# Patient Record
Sex: MALE | Race: WHITE | NOT HISPANIC OR LATINO
[De-identification: names, ages, dates, MRNs, and addresses within clinical notes are randomized per-mention and may not be internally consistent; named-entity substitution may affect disease eponyms.]

---

## 2017-04-10 ENCOUNTER — RX RENEWAL (OUTPATIENT)
Age: 67
End: 2017-04-10

## 2017-06-14 ENCOUNTER — LABORATORY RESULT (OUTPATIENT)
Age: 67
End: 2017-06-14

## 2017-06-14 ENCOUNTER — NON-APPOINTMENT (OUTPATIENT)
Age: 67
End: 2017-06-14

## 2017-06-14 ENCOUNTER — APPOINTMENT (OUTPATIENT)
Dept: CARDIOLOGY | Facility: CLINIC | Age: 67
End: 2017-06-14

## 2017-06-14 VITALS
HEIGHT: 78 IN | SYSTOLIC BLOOD PRESSURE: 139 MMHG | BODY MASS INDEX: 29.39 KG/M2 | OXYGEN SATURATION: 97 % | HEART RATE: 80 BPM | WEIGHT: 254 LBS | DIASTOLIC BLOOD PRESSURE: 92 MMHG

## 2017-06-16 LAB
25(OH)D3 SERPL-MCNC: 34.6 NG/ML
ALBUMIN SERPL ELPH-MCNC: 4.5 G/DL
ALP BLD-CCNC: 71 U/L
ALT SERPL-CCNC: 16 U/L
ANION GAP SERPL CALC-SCNC: 13 MMOL/L
AST SERPL-CCNC: 21 U/L
BASOPHILS # BLD AUTO: 0.06 K/UL
BASOPHILS NFR BLD AUTO: 0.6 %
BILIRUB SERPL-MCNC: 0.8 MG/DL
BUN SERPL-MCNC: 8 MG/DL
CALCIUM SERPL-MCNC: 9.5 MG/DL
CHLORIDE SERPL-SCNC: 102 MMOL/L
CHOLEST SERPL-MCNC: 192 MG/DL
CHOLEST/HDLC SERPL: 2.7 RATIO
CK SERPL-CCNC: 160 U/L
CO2 SERPL-SCNC: 25 MMOL/L
CREAT SERPL-MCNC: 0.96 MG/DL
EOSINOPHIL # BLD AUTO: 0.3 K/UL
EOSINOPHIL NFR BLD AUTO: 3.1 %
GLUCOSE SERPL-MCNC: 102 MG/DL
HBA1C MFR BLD HPLC: 5.7 %
HCT VFR BLD CALC: 48.8 %
HDLC SERPL-MCNC: 72 MG/DL
HGB BLD-MCNC: 16.3 G/DL
IMM GRANULOCYTES NFR BLD AUTO: 0.3 %
LDLC SERPL CALC-MCNC: 93 MG/DL
LDLC SERPL DIRECT ASSAY-MCNC: 103 MG/DL
LYMPHOCYTES # BLD AUTO: 2.52 K/UL
LYMPHOCYTES NFR BLD AUTO: 26.2 %
MAN DIFF?: NORMAL
MCHC RBC-ENTMCNC: 30.9 PG
MCHC RBC-ENTMCNC: 33.4 GM/DL
MCV RBC AUTO: 92.4 FL
MONOCYTES # BLD AUTO: 0.75 K/UL
MONOCYTES NFR BLD AUTO: 7.8 %
NEUTROPHILS # BLD AUTO: 5.95 K/UL
NEUTROPHILS NFR BLD AUTO: 62 %
PLATELET # BLD AUTO: 227 K/UL
POTASSIUM SERPL-SCNC: 5.2 MMOL/L
PROT SERPL-MCNC: 7.2 G/DL
RBC # BLD: 5.28 M/UL
RBC # FLD: 13.5 %
SODIUM SERPL-SCNC: 140 MMOL/L
T3 SERPL-MCNC: 102 NG/DL
T3FREE SERPL-MCNC: 2.93 PG/ML
T3RU NFR SERPL: 0.93 INDEX
T4 FREE SERPL-MCNC: 1.1 NG/DL
T4 SERPL-MCNC: 6.8 UG/DL
TRIGL SERPL-MCNC: 133 MG/DL
TSH SERPL-ACNC: 0.54 UIU/ML
WBC # FLD AUTO: 9.61 K/UL

## 2017-10-03 ENCOUNTER — RX RENEWAL (OUTPATIENT)
Age: 67
End: 2017-10-03

## 2017-11-28 ENCOUNTER — APPOINTMENT (OUTPATIENT)
Dept: CARDIOLOGY | Facility: CLINIC | Age: 67
End: 2017-11-28
Payer: MEDICARE

## 2017-11-28 PROCEDURE — 93306 TTE W/DOPPLER COMPLETE: CPT

## 2017-12-13 ENCOUNTER — NON-APPOINTMENT (OUTPATIENT)
Age: 67
End: 2017-12-13

## 2017-12-13 ENCOUNTER — APPOINTMENT (OUTPATIENT)
Dept: CARDIOLOGY | Facility: CLINIC | Age: 67
End: 2017-12-13
Payer: MEDICARE

## 2017-12-13 VITALS
SYSTOLIC BLOOD PRESSURE: 139 MMHG | DIASTOLIC BLOOD PRESSURE: 97 MMHG | HEIGHT: 72 IN | WEIGHT: 259 LBS | OXYGEN SATURATION: 95 % | BODY MASS INDEX: 35.08 KG/M2 | HEART RATE: 80 BPM

## 2017-12-13 DIAGNOSIS — I50.1 LEFT VENTRICULAR FAILURE, UNSPECIFIED: ICD-10-CM

## 2017-12-13 DIAGNOSIS — R06.09 OTHER FORMS OF DYSPNEA: ICD-10-CM

## 2017-12-13 PROCEDURE — 99215 OFFICE O/P EST HI 40 MIN: CPT | Mod: 25

## 2017-12-13 PROCEDURE — 93000 ELECTROCARDIOGRAM COMPLETE: CPT

## 2017-12-18 ENCOUNTER — RX RENEWAL (OUTPATIENT)
Age: 67
End: 2017-12-18

## 2017-12-19 ENCOUNTER — APPOINTMENT (OUTPATIENT)
Dept: CARDIOTHORACIC SURGERY | Facility: CLINIC | Age: 67
End: 2017-12-19
Payer: MEDICARE

## 2017-12-19 VITALS
OXYGEN SATURATION: 97 % | BODY MASS INDEX: 34.4 KG/M2 | SYSTOLIC BLOOD PRESSURE: 142 MMHG | WEIGHT: 254 LBS | HEART RATE: 83 BPM | HEIGHT: 72 IN | RESPIRATION RATE: 15 BRPM | DIASTOLIC BLOOD PRESSURE: 98 MMHG | TEMPERATURE: 97.6 F

## 2017-12-19 VITALS — DIASTOLIC BLOOD PRESSURE: 98 MMHG | SYSTOLIC BLOOD PRESSURE: 150 MMHG

## 2017-12-19 DIAGNOSIS — Z78.9 OTHER SPECIFIED HEALTH STATUS: ICD-10-CM

## 2017-12-19 PROCEDURE — 99205 OFFICE O/P NEW HI 60 MIN: CPT

## 2017-12-29 ENCOUNTER — OUTPATIENT (OUTPATIENT)
Dept: OUTPATIENT SERVICES | Facility: HOSPITAL | Age: 67
LOS: 1 days | Discharge: HOME | End: 2017-12-29

## 2017-12-29 VITALS
OXYGEN SATURATION: 98 % | HEART RATE: 88 BPM | RESPIRATION RATE: 16 BRPM | DIASTOLIC BLOOD PRESSURE: 90 MMHG | SYSTOLIC BLOOD PRESSURE: 150 MMHG

## 2017-12-29 DIAGNOSIS — I77.810 THORACIC AORTIC ECTASIA: ICD-10-CM

## 2017-12-29 DIAGNOSIS — S82.90XA UNSPECIFIED FRACTURE OF UNSPECIFIED LOWER LEG, INITIAL ENCOUNTER FOR CLOSED FRACTURE: Chronic | ICD-10-CM

## 2017-12-29 LAB
ANION GAP SERPL CALC-SCNC: 6 MMOL/L — SIGNIFICANT CHANGE UP (ref 5–17)
BUN SERPL-MCNC: 12 MG/DL — SIGNIFICANT CHANGE UP (ref 7–23)
CALCIUM SERPL-MCNC: 9.3 MG/DL — SIGNIFICANT CHANGE UP (ref 8.5–10.1)
CHLORIDE SERPL-SCNC: 106 MMOL/L — SIGNIFICANT CHANGE UP (ref 96–108)
CO2 SERPL-SCNC: 27 MMOL/L — SIGNIFICANT CHANGE UP (ref 22–31)
CREAT SERPL-MCNC: 0.96 MG/DL — SIGNIFICANT CHANGE UP (ref 0.5–1.3)
GLUCOSE SERPL-MCNC: 86 MG/DL — SIGNIFICANT CHANGE UP (ref 70–99)
HCT VFR BLD CALC: 54.6 % — HIGH (ref 39–50)
HGB BLD-MCNC: 17.8 G/DL — HIGH (ref 13–17)
MCHC RBC-ENTMCNC: 31.2 PG — SIGNIFICANT CHANGE UP (ref 27–34)
MCHC RBC-ENTMCNC: 32.7 GM/DL — SIGNIFICANT CHANGE UP (ref 32–36)
MCV RBC AUTO: 95.5 FL — SIGNIFICANT CHANGE UP (ref 80–100)
PLATELET # BLD AUTO: 201 K/UL — SIGNIFICANT CHANGE UP (ref 150–400)
POTASSIUM SERPL-MCNC: 4.2 MMOL/L — SIGNIFICANT CHANGE UP (ref 3.5–5.3)
POTASSIUM SERPL-SCNC: 4.2 MMOL/L — SIGNIFICANT CHANGE UP (ref 3.5–5.3)
RBC # BLD: 5.72 M/UL — SIGNIFICANT CHANGE UP (ref 4.2–5.8)
RBC # FLD: 11.9 % — SIGNIFICANT CHANGE UP (ref 10.3–14.5)
SODIUM SERPL-SCNC: 139 MMOL/L — SIGNIFICANT CHANGE UP (ref 135–145)
WBC # BLD: 9.3 K/UL — SIGNIFICANT CHANGE UP (ref 3.8–10.5)
WBC # FLD AUTO: 9.3 K/UL — SIGNIFICANT CHANGE UP (ref 3.8–10.5)

## 2017-12-29 NOTE — H&P CARDIOLOGY - HISTORY OF PRESENT ILLNESS
67 year old male with PMHx of 67 year old male with PMHx of HTN, BARRON on Bi-PAP, Afib on xarelto, echo showed dilated ascending aorta 5 cm with EF 56%. Pt referred for LHC followed by CT surgery consult with  at [facility].

## 2017-12-29 NOTE — H&P CARDIOLOGY - PMH
Abnormal stress test    Ascending aorta dilation    Atrial fibrillation    Hypertension    LVH (left ventricular hypertrophy)    Mitral valve insufficiency    BARRON (obstructive sleep apnea)

## 2018-01-02 ENCOUNTER — OUTPATIENT (OUTPATIENT)
Dept: OUTPATIENT SERVICES | Facility: HOSPITAL | Age: 68
LOS: 1 days | Discharge: HOME | End: 2018-01-02
Payer: MEDICARE

## 2018-01-02 VITALS
OXYGEN SATURATION: 97 % | DIASTOLIC BLOOD PRESSURE: 98 MMHG | SYSTOLIC BLOOD PRESSURE: 123 MMHG | RESPIRATION RATE: 16 BRPM | HEART RATE: 77 BPM

## 2018-01-02 VITALS
WEIGHT: 250 LBS | TEMPERATURE: 97 F | RESPIRATION RATE: 18 BRPM | HEIGHT: 75 IN | DIASTOLIC BLOOD PRESSURE: 89 MMHG | SYSTOLIC BLOOD PRESSURE: 144 MMHG | HEART RATE: 71 BPM

## 2018-01-02 DIAGNOSIS — S82.90XA UNSPECIFIED FRACTURE OF UNSPECIFIED LOWER LEG, INITIAL ENCOUNTER FOR CLOSED FRACTURE: Chronic | ICD-10-CM

## 2018-01-02 PROCEDURE — 99152 MOD SED SAME PHYS/QHP 5/>YRS: CPT

## 2018-01-02 PROCEDURE — 93458 L HRT ARTERY/VENTRICLE ANGIO: CPT | Mod: 26

## 2018-01-02 PROCEDURE — 93567 NJX CAR CTH SPRVLV AORTGRPHY: CPT

## 2018-01-02 RX ORDER — ALBUTEROL 90 UG/1
1 AEROSOL, METERED ORAL
Qty: 0 | Refills: 0 | COMMUNITY

## 2018-01-02 NOTE — PROGRESS NOTE ADULT - SUBJECTIVE AND OBJECTIVE BOX
s/p C    Patient feels well.  Denies chest pain, shortness of breath, dizziness or palpitations at this time    Right radial hemoband in place.  Procedure site CDI, no bleeding, no hematoma, able to move all digits with capillary refill < 3 seconds, fingers warm      HPI:    now s/p C revealing non obstructive CAD    --vital signs, diet and activity per post procedure orders  -ambulte ad oscar post bedrest  -encourage PO fluids  -plan of care discussed with patient, family and MD  -d/c after bedrest if stable  -post procedure and d/c instructions reviewed  -follow up with MD in 1-2 weeks  -Discussed therapeutic lifestyle changes to reduce risk factors such as following a cardiac diet, weight loss, maintaining a healthy weight, exercise, smoking cessation, medication compliance, and regular follow-up  with MD s/p LHC    Patient feels well.  Denies chest pain, shortness of breath, dizziness or palpitations at this time    Right radial hemoband removed.  Procedure site CDI, no bleeding, no hematoma, able to move all digits with capillary refill < 3 seconds, fingers warm      HPI:67 year old male with PMHx of HTN, BARRON on Bi-PAP, Afib on xarelto, echo showed dilated ascending aorta 5 cm with EF 56%. Pt referred for LHC followed by CT surgery consult with  at [facility].    now s/p LHC revealing non obstructive CAD    --vital signs, diet and activity per post procedure orders  -ambulate ad oscar post bedrest  -encourage PO fluids  -plan of care discussed with patient, family and MD  -d/c after bedrest if stable  -post procedure and d/c instructions reviewed  -follow up with MD in 1-2 weeks  -Discussed therapeutic lifestyle changes to reduce risk factors such as following a cardiac diet, weight loss, maintaining a healthy weight, exercise, smoking cessation, medication compliance, and regular follow-up  with MD

## 2018-01-10 ENCOUNTER — APPOINTMENT (OUTPATIENT)
Dept: CARDIOLOGY | Facility: CLINIC | Age: 68
End: 2018-01-10
Payer: MEDICARE

## 2018-01-10 VITALS — SYSTOLIC BLOOD PRESSURE: 119 MMHG | DIASTOLIC BLOOD PRESSURE: 84 MMHG | OXYGEN SATURATION: 97 % | HEART RATE: 51 BPM

## 2018-01-10 VITALS — BODY MASS INDEX: 34.86 KG/M2 | WEIGHT: 257 LBS

## 2018-01-10 PROCEDURE — 99214 OFFICE O/P EST MOD 30 MIN: CPT | Mod: 25

## 2018-01-10 PROCEDURE — 93000 ELECTROCARDIOGRAM COMPLETE: CPT

## 2018-01-11 ENCOUNTER — NON-APPOINTMENT (OUTPATIENT)
Age: 68
End: 2018-01-11

## 2018-01-12 DIAGNOSIS — G47.33 OBSTRUCTIVE SLEEP APNEA (ADULT) (PEDIATRIC): ICD-10-CM

## 2018-01-12 DIAGNOSIS — I48.91 UNSPECIFIED ATRIAL FIBRILLATION: ICD-10-CM

## 2018-01-12 DIAGNOSIS — R93.1 ABNORMAL FINDINGS ON DIAGNOSTIC IMAGING OF HEART AND CORONARY CIRCULATION: ICD-10-CM

## 2018-01-12 DIAGNOSIS — I10 ESSENTIAL (PRIMARY) HYPERTENSION: ICD-10-CM

## 2018-01-12 DIAGNOSIS — I34.0 NONRHEUMATIC MITRAL (VALVE) INSUFFICIENCY: ICD-10-CM

## 2018-01-12 DIAGNOSIS — I71.2 THORACIC AORTIC ANEURYSM, WITHOUT RUPTURE: ICD-10-CM

## 2018-01-12 DIAGNOSIS — Z87.891 PERSONAL HISTORY OF NICOTINE DEPENDENCE: ICD-10-CM

## 2018-01-25 ENCOUNTER — OUTPATIENT (OUTPATIENT)
Dept: OUTPATIENT SERVICES | Facility: HOSPITAL | Age: 68
LOS: 1 days | End: 2018-01-25
Payer: MEDICARE

## 2018-01-25 VITALS
TEMPERATURE: 98 F | OXYGEN SATURATION: 96 % | DIASTOLIC BLOOD PRESSURE: 82 MMHG | RESPIRATION RATE: 16 BRPM | HEART RATE: 87 BPM | SYSTOLIC BLOOD PRESSURE: 136 MMHG | WEIGHT: 255.96 LBS | HEIGHT: 72 IN

## 2018-01-25 DIAGNOSIS — I77.810 THORACIC AORTIC ECTASIA: ICD-10-CM

## 2018-01-25 DIAGNOSIS — I10 ESSENTIAL (PRIMARY) HYPERTENSION: ICD-10-CM

## 2018-01-25 DIAGNOSIS — I48.91 UNSPECIFIED ATRIAL FIBRILLATION: ICD-10-CM

## 2018-01-25 DIAGNOSIS — Z01.818 ENCOUNTER FOR OTHER PREPROCEDURAL EXAMINATION: ICD-10-CM

## 2018-01-25 DIAGNOSIS — S82.90XA UNSPECIFIED FRACTURE OF UNSPECIFIED LOWER LEG, INITIAL ENCOUNTER FOR CLOSED FRACTURE: Chronic | ICD-10-CM

## 2018-01-25 DIAGNOSIS — G47.33 OBSTRUCTIVE SLEEP APNEA (ADULT) (PEDIATRIC): ICD-10-CM

## 2018-01-25 LAB
ANION GAP SERPL CALC-SCNC: 17 MMOL/L — SIGNIFICANT CHANGE UP (ref 5–17)
BLD GP AB SCN SERPL QL: NEGATIVE — SIGNIFICANT CHANGE UP
BUN SERPL-MCNC: 13 MG/DL — SIGNIFICANT CHANGE UP (ref 7–23)
CALCIUM SERPL-MCNC: 9.9 MG/DL — SIGNIFICANT CHANGE UP (ref 8.4–10.5)
CHLORIDE SERPL-SCNC: 101 MMOL/L — SIGNIFICANT CHANGE UP (ref 96–108)
CO2 SERPL-SCNC: 24 MMOL/L — SIGNIFICANT CHANGE UP (ref 22–31)
CREAT SERPL-MCNC: 1.03 MG/DL — SIGNIFICANT CHANGE UP (ref 0.5–1.3)
GLUCOSE SERPL-MCNC: 104 MG/DL — HIGH (ref 70–99)
HBA1C BLD-MCNC: 5.5 % — SIGNIFICANT CHANGE UP (ref 4–5.6)
HCT VFR BLD CALC: 51.9 % — HIGH (ref 39–50)
HGB BLD-MCNC: 18 G/DL — HIGH (ref 13–17)
MCHC RBC-ENTMCNC: 32.7 PG — SIGNIFICANT CHANGE UP (ref 27–34)
MCHC RBC-ENTMCNC: 34.7 GM/DL — SIGNIFICANT CHANGE UP (ref 32–36)
MCV RBC AUTO: 94.2 FL — SIGNIFICANT CHANGE UP (ref 80–100)
MRSA PCR RESULT.: SIGNIFICANT CHANGE UP
PLATELET # BLD AUTO: 191 K/UL — SIGNIFICANT CHANGE UP (ref 150–400)
POTASSIUM SERPL-MCNC: 4.8 MMOL/L — SIGNIFICANT CHANGE UP (ref 3.5–5.3)
POTASSIUM SERPL-SCNC: 4.8 MMOL/L — SIGNIFICANT CHANGE UP (ref 3.5–5.3)
RBC # BLD: 5.51 M/UL — SIGNIFICANT CHANGE UP (ref 4.2–5.8)
RBC # FLD: 12.9 % — SIGNIFICANT CHANGE UP (ref 10.3–14.5)
RH IG SCN BLD-IMP: POSITIVE — SIGNIFICANT CHANGE UP
S AUREUS DNA NOSE QL NAA+PROBE: DETECTED
SODIUM SERPL-SCNC: 142 MMOL/L — SIGNIFICANT CHANGE UP (ref 135–145)
WBC # BLD: 10.12 K/UL — SIGNIFICANT CHANGE UP (ref 3.8–10.5)
WBC # FLD AUTO: 10.12 K/UL — SIGNIFICANT CHANGE UP (ref 3.8–10.5)

## 2018-01-25 PROCEDURE — 83036 HEMOGLOBIN GLYCOSYLATED A1C: CPT

## 2018-01-25 PROCEDURE — 71046 X-RAY EXAM CHEST 2 VIEWS: CPT | Mod: 26

## 2018-01-25 PROCEDURE — 93880 EXTRACRANIAL BILAT STUDY: CPT

## 2018-01-25 PROCEDURE — G0463: CPT

## 2018-01-25 PROCEDURE — 93880 EXTRACRANIAL BILAT STUDY: CPT | Mod: 26

## 2018-01-25 PROCEDURE — 80048 BASIC METABOLIC PNL TOTAL CA: CPT

## 2018-01-25 PROCEDURE — 86901 BLOOD TYPING SEROLOGIC RH(D): CPT

## 2018-01-25 PROCEDURE — 87640 STAPH A DNA AMP PROBE: CPT

## 2018-01-25 PROCEDURE — 85027 COMPLETE CBC AUTOMATED: CPT

## 2018-01-25 PROCEDURE — 86900 BLOOD TYPING SEROLOGIC ABO: CPT

## 2018-01-25 PROCEDURE — 86850 RBC ANTIBODY SCREEN: CPT

## 2018-01-25 PROCEDURE — 71046 X-RAY EXAM CHEST 2 VIEWS: CPT

## 2018-01-25 PROCEDURE — 87641 MR-STAPH DNA AMP PROBE: CPT

## 2018-01-25 RX ORDER — LIDOCAINE HCL 20 MG/ML
0.2 VIAL (ML) INJECTION ONCE
Qty: 0 | Refills: 0 | Status: DISCONTINUED | OUTPATIENT
Start: 2018-02-08 | End: 2018-02-08

## 2018-01-25 RX ORDER — CEFUROXIME AXETIL 250 MG
1500 TABLET ORAL ONCE
Qty: 0 | Refills: 0 | Status: COMPLETED | OUTPATIENT
Start: 2018-02-08 | End: 2018-02-08

## 2018-01-25 RX ORDER — SODIUM CHLORIDE 9 MG/ML
3 INJECTION INTRAMUSCULAR; INTRAVENOUS; SUBCUTANEOUS EVERY 8 HOURS
Qty: 0 | Refills: 0 | Status: DISCONTINUED | OUTPATIENT
Start: 2018-02-08 | End: 2018-02-08

## 2018-01-25 NOTE — H&P PST ADULT - NEGATIVE CARDIOVASCULAR SYMPTOMS
no chest pain/no claudication/no peripheral edema/no palpitations/no paroxysmal nocturnal dyspnea/no orthopnea/no dyspnea on exertion

## 2018-01-25 NOTE — H&P PST ADULT - MUSCULOSKELETAL
details… detailed exam no joint warmth/no joint swelling/no joint erythema/normal strength/ROM intact

## 2018-01-25 NOTE — H&P PST ADULT - RS GEN PE MLT RESP DETAILS PC
clear to auscultation bilaterally/breath sounds equal/no rales/no rhonchi/no wheezes/no chest wall tenderness

## 2018-01-25 NOTE — H&P PST ADULT - HISTORY OF PRESENT ILLNESS
67 year old male retired [employer] of a [employer] with p 67 year old male retired [employer] with PMH of HTN, LVH, mild chronic LV systolic dysfunction, cardiomyopathy, Afib ( on Xarelto), BARRON(on C-pap), COPD(emphysema), tricuspid regurgitation, lumbar disc problem reports having" found thoracic aortic dilation by a routine echocardiogram, with out much symptoms, on 11/28/17(4.5 to 5.0 cms of ascending aorta"> s/p cardiac cath on 01/02/18( nonobstructive CAD, EF 45%,, coronary ecstasies scheduled for ascending aorta hemiarch replacement via thoracotomy on 02/08/18.

## 2018-01-25 NOTE — H&P PST ADULT - PROBLEM SELECTOR PLAN 2
called surgeon's office spoke to Ms borja about Xarelto plan-( "we will discuss with patient later today that about the plan") Advised patient/family to call Maximo after PST.'

## 2018-01-25 NOTE — H&P PST ADULT - PMH
Abnormal stress test    Ascending aorta dilation    Atrial fibrillation    Hypertension    LVH (left ventricular hypertrophy)    Mitral valve insufficiency    BARRON (obstructive sleep apnea) Abnormal stress test    Ascending aorta dilation  5.0 cms  Atrial fibrillation  xarelto  COPD with emphysema  h/o  on advair  Hypertension    LVH (left ventricular hypertrophy)    Mitral valve insufficiency    Mitral valve insufficiency    BARRON (obstructive sleep apnea)  c-pap  TR (tricuspid regurgitation)

## 2018-01-25 NOTE — H&P PST ADULT - NEUROLOGICAL DETAILS
sensation intact/alert and oriented x 3/cranial nerves intact/responds to pain/responds to verbal commands

## 2018-01-25 NOTE — H&P PST ADULT - NEGATIVE NEUROLOGICAL SYMPTOMS
no syncope/no difficulty walking/no tremors/no loss of sensation/no transient paralysis/no weakness/no headache

## 2018-01-25 NOTE — H&P PST ADULT - NEGATIVE RESPIRATORY AND THORAX SYMPTOMS
no cough/no hemoptysis/no wheezing/no dyspnea no dyspnea/"no problem"/no cough/no hemoptysis/no wheezing

## 2018-01-30 ENCOUNTER — CLINICAL ADVICE (OUTPATIENT)
Age: 68
End: 2018-01-30

## 2018-02-01 ENCOUNTER — APPOINTMENT (OUTPATIENT)
Dept: ULTRASOUND IMAGING | Facility: HOSPITAL | Age: 68
End: 2018-02-01

## 2018-02-01 ENCOUNTER — APPOINTMENT (OUTPATIENT)
Dept: CARDIOTHORACIC SURGERY | Facility: CLINIC | Age: 68
End: 2018-02-01

## 2018-02-08 ENCOUNTER — INPATIENT (INPATIENT)
Facility: HOSPITAL | Age: 68
LOS: 4 days | Discharge: HOME | DRG: 220 | End: 2018-02-13
Attending: THORACIC SURGERY (CARDIOTHORACIC VASCULAR SURGERY) | Admitting: THORACIC SURGERY (CARDIOTHORACIC VASCULAR SURGERY)
Payer: MEDICARE

## 2018-02-08 ENCOUNTER — APPOINTMENT (OUTPATIENT)
Dept: CARDIOTHORACIC SURGERY | Facility: HOSPITAL | Age: 68
End: 2018-02-08
Payer: MEDICARE

## 2018-02-08 VITALS
WEIGHT: 259.93 LBS | RESPIRATION RATE: 18 BRPM | HEART RATE: 87 BPM | TEMPERATURE: 98 F | SYSTOLIC BLOOD PRESSURE: 140 MMHG | DIASTOLIC BLOOD PRESSURE: 95 MMHG

## 2018-02-08 DIAGNOSIS — I77.810 THORACIC AORTIC ECTASIA: ICD-10-CM

## 2018-02-08 DIAGNOSIS — S82.90XA UNSPECIFIED FRACTURE OF UNSPECIFIED LOWER LEG, INITIAL ENCOUNTER FOR CLOSED FRACTURE: Chronic | ICD-10-CM

## 2018-02-08 LAB
ALBUMIN SERPL ELPH-MCNC: 3.8 G/DL — SIGNIFICANT CHANGE UP (ref 3.3–5)
ALP SERPL-CCNC: 38 U/L — LOW (ref 40–120)
ALT FLD-CCNC: 13 U/L RC — SIGNIFICANT CHANGE UP (ref 10–45)
ANION GAP SERPL CALC-SCNC: 16 MMOL/L — SIGNIFICANT CHANGE UP (ref 5–17)
APTT BLD: 27.4 SEC — LOW (ref 27.5–37.4)
AST SERPL-CCNC: 26 U/L — SIGNIFICANT CHANGE UP (ref 10–40)
BASOPHILS # BLD AUTO: 0.1 K/UL — SIGNIFICANT CHANGE UP (ref 0–0.2)
BASOPHILS NFR BLD AUTO: 0.4 % — SIGNIFICANT CHANGE UP (ref 0–2)
BILIRUB SERPL-MCNC: 0.8 MG/DL — SIGNIFICANT CHANGE UP (ref 0.2–1.2)
BUN SERPL-MCNC: 14 MG/DL — SIGNIFICANT CHANGE UP (ref 7–23)
CALCIUM SERPL-MCNC: 7.6 MG/DL — LOW (ref 8.4–10.5)
CHLORIDE SERPL-SCNC: 101 MMOL/L — SIGNIFICANT CHANGE UP (ref 96–108)
CK MB BLD-MCNC: 4.9 % — HIGH (ref 0–3.5)
CK MB CFR SERPL CALC: 11.7 NG/ML — HIGH (ref 0–6.7)
CK SERPL-CCNC: 238 U/L — HIGH (ref 30–200)
CO2 SERPL-SCNC: 20 MMOL/L — LOW (ref 22–31)
CREAT SERPL-MCNC: 0.93 MG/DL — SIGNIFICANT CHANGE UP (ref 0.5–1.3)
EOSINOPHIL # BLD AUTO: 0.1 K/UL — SIGNIFICANT CHANGE UP (ref 0–0.5)
EOSINOPHIL NFR BLD AUTO: 0.4 % — SIGNIFICANT CHANGE UP (ref 0–6)
FIBRINOGEN PPP-MCNC: 323 MG/DL — SIGNIFICANT CHANGE UP (ref 310–510)
GAS PNL BLDA: SIGNIFICANT CHANGE UP
GLUCOSE SERPL-MCNC: 180 MG/DL — HIGH (ref 70–99)
HCT VFR BLD CALC: 41.7 % — SIGNIFICANT CHANGE UP (ref 39–50)
HGB BLD-MCNC: 14.3 G/DL — SIGNIFICANT CHANGE UP (ref 13–17)
INR BLD: 1.25 RATIO — HIGH (ref 0.88–1.16)
LYMPHOCYTES # BLD AUTO: 12.6 % — LOW (ref 13–44)
LYMPHOCYTES # BLD AUTO: 2.7 K/UL — SIGNIFICANT CHANGE UP (ref 1–3.3)
MCHC RBC-ENTMCNC: 32.9 PG — SIGNIFICANT CHANGE UP (ref 27–34)
MCHC RBC-ENTMCNC: 34.2 GM/DL — SIGNIFICANT CHANGE UP (ref 32–36)
MCV RBC AUTO: 96.2 FL — SIGNIFICANT CHANGE UP (ref 80–100)
MONOCYTES # BLD AUTO: 1.4 K/UL — HIGH (ref 0–0.9)
MONOCYTES NFR BLD AUTO: 6.6 % — SIGNIFICANT CHANGE UP (ref 2–14)
NEUTROPHILS # BLD AUTO: 17.1 K/UL — HIGH (ref 1.8–7.4)
NEUTROPHILS NFR BLD AUTO: 80 % — HIGH (ref 43–77)
PLATELET # BLD AUTO: 139 K/UL — LOW (ref 150–400)
POTASSIUM SERPL-MCNC: 3.5 MMOL/L — SIGNIFICANT CHANGE UP (ref 3.5–5.3)
POTASSIUM SERPL-SCNC: 3.5 MMOL/L — SIGNIFICANT CHANGE UP (ref 3.5–5.3)
PROT SERPL-MCNC: 6.2 G/DL — SIGNIFICANT CHANGE UP (ref 6–8.3)
PROTHROM AB SERPL-ACNC: 13.7 SEC — HIGH (ref 9.8–12.7)
RBC # BLD: 4.33 M/UL — SIGNIFICANT CHANGE UP (ref 4.2–5.8)
RBC # FLD: 12.2 % — SIGNIFICANT CHANGE UP (ref 10.3–14.5)
RH IG SCN BLD-IMP: POSITIVE — SIGNIFICANT CHANGE UP
SODIUM SERPL-SCNC: 137 MMOL/L — SIGNIFICANT CHANGE UP (ref 135–145)
TROPONIN T SERPL-MCNC: 0.2 NG/ML — HIGH (ref 0–0.06)
WBC # BLD: 21.4 K/UL — HIGH (ref 3.8–10.5)
WBC # FLD AUTO: 21.4 K/UL — HIGH (ref 3.8–10.5)

## 2018-02-08 PROCEDURE — 33863 ASCENDING AORTIC GRAFT: CPT

## 2018-02-08 PROCEDURE — 71045 X-RAY EXAM CHEST 1 VIEW: CPT | Mod: 26

## 2018-02-08 PROCEDURE — 93010 ELECTROCARDIOGRAM REPORT: CPT

## 2018-02-08 RX ORDER — INSULIN HUMAN 100 [IU]/ML
2 INJECTION, SOLUTION SUBCUTANEOUS
Qty: 100 | Refills: 0 | Status: DISCONTINUED | OUTPATIENT
Start: 2018-02-08 | End: 2018-02-09

## 2018-02-08 RX ORDER — DEXTROSE 50 % IN WATER 50 %
25 SYRINGE (ML) INTRAVENOUS
Qty: 0 | Refills: 0 | Status: DISCONTINUED | OUTPATIENT
Start: 2018-02-08 | End: 2018-02-13

## 2018-02-08 RX ORDER — MEPERIDINE HYDROCHLORIDE 50 MG/ML
25 INJECTION INTRAMUSCULAR; INTRAVENOUS; SUBCUTANEOUS ONCE
Qty: 0 | Refills: 0 | Status: DISCONTINUED | OUTPATIENT
Start: 2018-02-08 | End: 2018-02-08

## 2018-02-08 RX ORDER — HYDROMORPHONE HYDROCHLORIDE 2 MG/ML
0.5 INJECTION INTRAMUSCULAR; INTRAVENOUS; SUBCUTANEOUS ONCE
Qty: 0 | Refills: 0 | Status: DISCONTINUED | OUTPATIENT
Start: 2018-02-08 | End: 2018-02-08

## 2018-02-08 RX ORDER — DEXTROSE 50 % IN WATER 50 %
50 SYRINGE (ML) INTRAVENOUS
Qty: 0 | Refills: 0 | Status: DISCONTINUED | OUTPATIENT
Start: 2018-02-08 | End: 2018-02-13

## 2018-02-08 RX ORDER — DOBUTAMINE HCL 250MG/20ML
1 VIAL (ML) INTRAVENOUS
Qty: 500 | Refills: 0 | Status: DISCONTINUED | OUTPATIENT
Start: 2018-02-08 | End: 2018-02-09

## 2018-02-08 RX ORDER — DEXMEDETOMIDINE HYDROCHLORIDE IN 0.9% SODIUM CHLORIDE 4 UG/ML
0.3 INJECTION INTRAVENOUS
Qty: 200 | Refills: 0 | Status: DISCONTINUED | OUTPATIENT
Start: 2018-02-08 | End: 2018-02-08

## 2018-02-08 RX ORDER — NOREPINEPHRINE BITARTRATE/D5W 8 MG/250ML
0.06 PLASTIC BAG, INJECTION (ML) INTRAVENOUS
Qty: 8 | Refills: 0 | Status: DISCONTINUED | OUTPATIENT
Start: 2018-02-08 | End: 2018-02-09

## 2018-02-08 RX ORDER — POTASSIUM CHLORIDE 20 MEQ
10 PACKET (EA) ORAL
Qty: 0 | Refills: 0 | Status: DISCONTINUED | OUTPATIENT
Start: 2018-02-08 | End: 2018-02-09

## 2018-02-08 RX ORDER — MAGNESIUM SULFATE 500 MG/ML
1 VIAL (ML) INJECTION ONCE
Qty: 0 | Refills: 0 | Status: COMPLETED | OUTPATIENT
Start: 2018-02-08 | End: 2018-02-08

## 2018-02-08 RX ORDER — SODIUM CHLORIDE 9 MG/ML
1000 INJECTION, SOLUTION INTRAVENOUS
Qty: 0 | Refills: 0 | Status: DISCONTINUED | OUTPATIENT
Start: 2018-02-08 | End: 2018-02-13

## 2018-02-08 RX ORDER — POTASSIUM CHLORIDE 20 MEQ
10 PACKET (EA) ORAL
Qty: 0 | Refills: 0 | Status: COMPLETED | OUTPATIENT
Start: 2018-02-08 | End: 2018-02-08

## 2018-02-08 RX ORDER — POTASSIUM CHLORIDE 20 MEQ
10 PACKET (EA) ORAL ONCE
Qty: 0 | Refills: 0 | Status: DISCONTINUED | OUTPATIENT
Start: 2018-02-08 | End: 2018-02-09

## 2018-02-08 RX ORDER — ACETAMINOPHEN 500 MG
1000 TABLET ORAL ONCE
Qty: 0 | Refills: 0 | Status: DISCONTINUED | OUTPATIENT
Start: 2018-02-08 | End: 2018-02-08

## 2018-02-08 RX ORDER — SODIUM CHLORIDE 9 MG/ML
500 INJECTION, SOLUTION INTRAVENOUS ONCE
Qty: 0 | Refills: 0 | Status: COMPLETED | OUTPATIENT
Start: 2018-02-08 | End: 2018-02-08

## 2018-02-08 RX ORDER — EPINEPHRINE 0.3 MG/.3ML
0.03 INJECTION INTRAMUSCULAR; SUBCUTANEOUS
Qty: 4 | Refills: 0 | Status: DISCONTINUED | OUTPATIENT
Start: 2018-02-08 | End: 2018-02-08

## 2018-02-08 RX ORDER — CEFUROXIME AXETIL 250 MG
1500 TABLET ORAL EVERY 8 HOURS
Qty: 0 | Refills: 0 | Status: COMPLETED | OUTPATIENT
Start: 2018-02-08 | End: 2018-02-10

## 2018-02-08 RX ADMIN — DEXMEDETOMIDINE HYDROCHLORIDE IN 0.9% SODIUM CHLORIDE 8.84 MICROGRAM(S)/KG/HR: 4 INJECTION INTRAVENOUS at 17:21

## 2018-02-08 RX ADMIN — HYDROMORPHONE HYDROCHLORIDE 0.5 MILLIGRAM(S): 2 INJECTION INTRAMUSCULAR; INTRAVENOUS; SUBCUTANEOUS at 16:10

## 2018-02-08 RX ADMIN — Medication 100 MILLIEQUIVALENT(S): at 15:15

## 2018-02-08 RX ADMIN — Medication 13 MICROGRAM(S)/KG/MIN: at 16:16

## 2018-02-08 RX ADMIN — Medication 100 MILLIEQUIVALENT(S): at 15:31

## 2018-02-08 RX ADMIN — Medication 100 GRAM(S): at 15:30

## 2018-02-08 RX ADMIN — Medication 100 MILLIEQUIVALENT(S): at 14:43

## 2018-02-08 RX ADMIN — HYDROMORPHONE HYDROCHLORIDE 0.5 MILLIGRAM(S): 2 INJECTION INTRAMUSCULAR; INTRAVENOUS; SUBCUTANEOUS at 15:55

## 2018-02-08 RX ADMIN — SODIUM CHLORIDE 3000 MILLILITER(S): 9 INJECTION, SOLUTION INTRAVENOUS at 14:22

## 2018-02-08 RX ADMIN — HYDROMORPHONE HYDROCHLORIDE 0.5 MILLIGRAM(S): 2 INJECTION INTRAMUSCULAR; INTRAVENOUS; SUBCUTANEOUS at 16:23

## 2018-02-08 RX ADMIN — Medication 100 MILLIGRAM(S): at 16:23

## 2018-02-08 RX ADMIN — Medication 100 MILLIEQUIVALENT(S): at 15:53

## 2018-02-08 RX ADMIN — Medication 8.84 MICROGRAM(S)/KG/MIN: at 18:17

## 2018-02-08 RX ADMIN — Medication 100 MILLIEQUIVALENT(S): at 14:13

## 2018-02-08 RX ADMIN — HYDROMORPHONE HYDROCHLORIDE 0.5 MILLIGRAM(S): 2 INJECTION INTRAMUSCULAR; INTRAVENOUS; SUBCUTANEOUS at 16:52

## 2018-02-08 RX ADMIN — Medication 100 MILLIEQUIVALENT(S): at 15:35

## 2018-02-08 RX ADMIN — INSULIN HUMAN 2 UNIT(S)/HR: 100 INJECTION, SOLUTION SUBCUTANEOUS at 18:17

## 2018-02-08 NOTE — AIRWAY REMOVAL NOTE  ADULT & PEDS - ARTIFICAL AIRWAY REMOVAL COMMENTS
Written order for extubation verified. Pt was identified by full name and birthday compared to ID band.  Present during the procedure was Cheryl OLVERA

## 2018-02-08 NOTE — PATIENT PROFILE ADULT. - PMH
Abnormal stress test    Ascending aorta dilation  5.0 cms  Atrial fibrillation  xarelto  COPD with emphysema  h/o  on advair  Hypertension    LVH (left ventricular hypertrophy)    Mitral valve insufficiency    Mitral valve insufficiency    BARRON (obstructive sleep apnea)  c-pap  TR (tricuspid regurgitation)

## 2018-02-08 NOTE — BRIEF OPERATIVE NOTE - PROCEDURE
<<-----Click on this checkbox to enter Procedure Ascending aorta graft  02/08/2018  Minimally invasive ascending aorta replacement, transverse hemiarch replacement under circulatory arrest with selective antegrade perfusion to head. Left atrial appendage clip.  Active  [id number]

## 2018-02-09 ENCOUNTER — RESULT REVIEW (OUTPATIENT)
Age: 68
End: 2018-02-09

## 2018-02-09 DIAGNOSIS — Z29.9 ENCOUNTER FOR PROPHYLACTIC MEASURES, UNSPECIFIED: ICD-10-CM

## 2018-02-09 DIAGNOSIS — J44.9 CHRONIC OBSTRUCTIVE PULMONARY DISEASE, UNSPECIFIED: ICD-10-CM

## 2018-02-09 DIAGNOSIS — Z98.890 OTHER SPECIFIED POSTPROCEDURAL STATES: ICD-10-CM

## 2018-02-09 LAB
ALBUMIN SERPL ELPH-MCNC: 3.7 G/DL — SIGNIFICANT CHANGE UP (ref 3.3–5)
ALP SERPL-CCNC: 38 U/L — LOW (ref 40–120)
ALT FLD-CCNC: 13 U/L RC — SIGNIFICANT CHANGE UP (ref 10–45)
ANION GAP SERPL CALC-SCNC: 11 MMOL/L — SIGNIFICANT CHANGE UP (ref 5–17)
APTT BLD: 30.9 SEC — SIGNIFICANT CHANGE UP (ref 27.5–37.4)
AST SERPL-CCNC: 33 U/L — SIGNIFICANT CHANGE UP (ref 10–40)
BASOPHILS # BLD AUTO: 0 K/UL — SIGNIFICANT CHANGE UP (ref 0–0.2)
BASOPHILS NFR BLD AUTO: 0 % — SIGNIFICANT CHANGE UP (ref 0–2)
BILIRUB SERPL-MCNC: 0.7 MG/DL — SIGNIFICANT CHANGE UP (ref 0.2–1.2)
BUN SERPL-MCNC: 16 MG/DL — SIGNIFICANT CHANGE UP (ref 7–23)
CALCIUM SERPL-MCNC: 8.4 MG/DL — SIGNIFICANT CHANGE UP (ref 8.4–10.5)
CHLORIDE SERPL-SCNC: 105 MMOL/L — SIGNIFICANT CHANGE UP (ref 96–108)
CO2 SERPL-SCNC: 24 MMOL/L — SIGNIFICANT CHANGE UP (ref 22–31)
CREAT SERPL-MCNC: 0.78 MG/DL — SIGNIFICANT CHANGE UP (ref 0.5–1.3)
EOSINOPHIL # BLD AUTO: 0 K/UL — SIGNIFICANT CHANGE UP (ref 0–0.5)
EOSINOPHIL NFR BLD AUTO: 0 % — SIGNIFICANT CHANGE UP (ref 0–6)
GAS PNL BLDA: SIGNIFICANT CHANGE UP
GLUCOSE SERPL-MCNC: 137 MG/DL — HIGH (ref 70–99)
HCT VFR BLD CALC: 40.5 % — SIGNIFICANT CHANGE UP (ref 39–50)
HGB BLD-MCNC: 14.1 G/DL — SIGNIFICANT CHANGE UP (ref 13–17)
INR BLD: 1.22 RATIO — HIGH (ref 0.88–1.16)
LYMPHOCYTES # BLD AUTO: 0.3 K/UL — LOW (ref 1–3.3)
LYMPHOCYTES # BLD AUTO: 3 % — LOW (ref 13–44)
MCHC RBC-ENTMCNC: 33 PG — SIGNIFICANT CHANGE UP (ref 27–34)
MCHC RBC-ENTMCNC: 34.7 GM/DL — SIGNIFICANT CHANGE UP (ref 32–36)
MCV RBC AUTO: 95.2 FL — SIGNIFICANT CHANGE UP (ref 80–100)
MONOCYTES # BLD AUTO: 0.5 K/UL — SIGNIFICANT CHANGE UP (ref 0–0.9)
MONOCYTES NFR BLD AUTO: 5.3 % — SIGNIFICANT CHANGE UP (ref 2–14)
NEUTROPHILS # BLD AUTO: 8.6 K/UL — HIGH (ref 1.8–7.4)
NEUTROPHILS NFR BLD AUTO: 91.7 % — HIGH (ref 43–77)
PLATELET # BLD AUTO: 112 K/UL — LOW (ref 150–400)
POTASSIUM SERPL-MCNC: 4 MMOL/L — SIGNIFICANT CHANGE UP (ref 3.5–5.3)
POTASSIUM SERPL-SCNC: 4 MMOL/L — SIGNIFICANT CHANGE UP (ref 3.5–5.3)
PROT SERPL-MCNC: 5.9 G/DL — LOW (ref 6–8.3)
PROTHROM AB SERPL-ACNC: 13.4 SEC — HIGH (ref 9.8–12.7)
RBC # BLD: 4.26 M/UL — SIGNIFICANT CHANGE UP (ref 4.2–5.8)
RBC # FLD: 12.2 % — SIGNIFICANT CHANGE UP (ref 10.3–14.5)
SODIUM SERPL-SCNC: 140 MMOL/L — SIGNIFICANT CHANGE UP (ref 135–145)
WBC # BLD: 9.4 K/UL — SIGNIFICANT CHANGE UP (ref 3.8–10.5)
WBC # FLD AUTO: 9.4 K/UL — SIGNIFICANT CHANGE UP (ref 3.8–10.5)

## 2018-02-09 PROCEDURE — 71045 X-RAY EXAM CHEST 1 VIEW: CPT | Mod: 26

## 2018-02-09 PROCEDURE — 93010 ELECTROCARDIOGRAM REPORT: CPT

## 2018-02-09 PROCEDURE — 88304 TISSUE EXAM BY PATHOLOGIST: CPT | Mod: 26

## 2018-02-09 RX ORDER — KETOROLAC TROMETHAMINE 30 MG/ML
30 SYRINGE (ML) INJECTION ONCE
Qty: 0 | Refills: 0 | Status: DISCONTINUED | OUTPATIENT
Start: 2018-02-09 | End: 2018-02-09

## 2018-02-09 RX ORDER — DOCUSATE SODIUM 100 MG
100 CAPSULE ORAL THREE TIMES A DAY
Qty: 0 | Refills: 0 | Status: DISCONTINUED | OUTPATIENT
Start: 2018-02-09 | End: 2018-02-13

## 2018-02-09 RX ORDER — CHOLECALCIFEROL (VITAMIN D3) 125 MCG
1000 CAPSULE ORAL DAILY
Qty: 0 | Refills: 0 | Status: DISCONTINUED | OUTPATIENT
Start: 2018-02-09 | End: 2018-02-13

## 2018-02-09 RX ORDER — OXYCODONE AND ACETAMINOPHEN 5; 325 MG/1; MG/1
2 TABLET ORAL EVERY 6 HOURS
Qty: 0 | Refills: 0 | Status: DISCONTINUED | OUTPATIENT
Start: 2018-02-09 | End: 2018-02-10

## 2018-02-09 RX ORDER — MONTELUKAST 4 MG/1
10 TABLET, CHEWABLE ORAL DAILY
Qty: 0 | Refills: 0 | Status: DISCONTINUED | OUTPATIENT
Start: 2018-02-09 | End: 2018-02-13

## 2018-02-09 RX ORDER — BUDESONIDE AND FORMOTEROL FUMARATE DIHYDRATE 160; 4.5 UG/1; UG/1
2 AEROSOL RESPIRATORY (INHALATION)
Qty: 0 | Refills: 0 | Status: DISCONTINUED | OUTPATIENT
Start: 2018-02-09 | End: 2018-02-13

## 2018-02-09 RX ORDER — PANTOPRAZOLE SODIUM 20 MG/1
40 TABLET, DELAYED RELEASE ORAL
Qty: 0 | Refills: 0 | Status: DISCONTINUED | OUTPATIENT
Start: 2018-02-09 | End: 2018-02-13

## 2018-02-09 RX ORDER — ONDANSETRON 8 MG/1
4 TABLET, FILM COATED ORAL EVERY 6 HOURS
Qty: 0 | Refills: 0 | Status: DISCONTINUED | OUTPATIENT
Start: 2018-02-09 | End: 2018-02-13

## 2018-02-09 RX ORDER — HYDROMORPHONE HYDROCHLORIDE 2 MG/ML
0.5 INJECTION INTRAMUSCULAR; INTRAVENOUS; SUBCUTANEOUS ONCE
Qty: 0 | Refills: 0 | Status: DISCONTINUED | OUTPATIENT
Start: 2018-02-09 | End: 2018-02-09

## 2018-02-09 RX ORDER — METOPROLOL TARTRATE 50 MG
12.5 TABLET ORAL EVERY 12 HOURS
Qty: 0 | Refills: 0 | Status: DISCONTINUED | OUTPATIENT
Start: 2018-02-09 | End: 2018-02-10

## 2018-02-09 RX ORDER — HYDROMORPHONE HYDROCHLORIDE 2 MG/ML
30 INJECTION INTRAMUSCULAR; INTRAVENOUS; SUBCUTANEOUS
Qty: 0 | Refills: 0 | Status: DISCONTINUED | OUTPATIENT
Start: 2018-02-09 | End: 2018-02-10

## 2018-02-09 RX ORDER — HYDROMORPHONE HYDROCHLORIDE 2 MG/ML
0.5 INJECTION INTRAMUSCULAR; INTRAVENOUS; SUBCUTANEOUS
Qty: 0 | Refills: 0 | Status: DISCONTINUED | OUTPATIENT
Start: 2018-02-09 | End: 2018-02-10

## 2018-02-09 RX ORDER — NALOXONE HYDROCHLORIDE 4 MG/.1ML
0.1 SPRAY NASAL
Qty: 0 | Refills: 0 | Status: DISCONTINUED | OUTPATIENT
Start: 2018-02-09 | End: 2018-02-13

## 2018-02-09 RX ORDER — ENOXAPARIN SODIUM 100 MG/ML
40 INJECTION SUBCUTANEOUS DAILY
Qty: 0 | Refills: 0 | Status: DISCONTINUED | OUTPATIENT
Start: 2018-02-09 | End: 2018-02-11

## 2018-02-09 RX ORDER — ASPIRIN/CALCIUM CARB/MAGNESIUM 324 MG
81 TABLET ORAL DAILY
Qty: 0 | Refills: 0 | Status: DISCONTINUED | OUTPATIENT
Start: 2018-02-09 | End: 2018-02-13

## 2018-02-09 RX ORDER — INSULIN LISPRO 100/ML
VIAL (ML) SUBCUTANEOUS
Qty: 0 | Refills: 0 | Status: DISCONTINUED | OUTPATIENT
Start: 2018-02-09 | End: 2018-02-13

## 2018-02-09 RX ORDER — OXYCODONE AND ACETAMINOPHEN 5; 325 MG/1; MG/1
1 TABLET ORAL EVERY 4 HOURS
Qty: 0 | Refills: 0 | Status: DISCONTINUED | OUTPATIENT
Start: 2018-02-09 | End: 2018-02-10

## 2018-02-09 RX ORDER — ACETAMINOPHEN 500 MG
1000 TABLET ORAL ONCE
Qty: 0 | Refills: 0 | Status: COMPLETED | OUTPATIENT
Start: 2018-02-09 | End: 2018-02-09

## 2018-02-09 RX ORDER — KETOROLAC TROMETHAMINE 30 MG/ML
15 SYRINGE (ML) INJECTION EVERY 8 HOURS
Qty: 0 | Refills: 0 | Status: DISCONTINUED | OUTPATIENT
Start: 2018-02-09 | End: 2018-02-10

## 2018-02-09 RX ORDER — INSULIN LISPRO 100/ML
VIAL (ML) SUBCUTANEOUS AT BEDTIME
Qty: 0 | Refills: 0 | Status: DISCONTINUED | OUTPATIENT
Start: 2018-02-09 | End: 2018-02-13

## 2018-02-09 RX ADMIN — Medication 100 MILLIGRAM(S): at 16:27

## 2018-02-09 RX ADMIN — INSULIN HUMAN 2 UNIT(S)/HR: 100 INJECTION, SOLUTION SUBCUTANEOUS at 06:20

## 2018-02-09 RX ADMIN — Medication 1000 UNIT(S): at 16:28

## 2018-02-09 RX ADMIN — Medication 100 MILLIGRAM(S): at 16:28

## 2018-02-09 RX ADMIN — HYDROMORPHONE HYDROCHLORIDE 0.5 MILLIGRAM(S): 2 INJECTION INTRAMUSCULAR; INTRAVENOUS; SUBCUTANEOUS at 03:00

## 2018-02-09 RX ADMIN — PANTOPRAZOLE SODIUM 40 MILLIGRAM(S): 20 TABLET, DELAYED RELEASE ORAL at 08:28

## 2018-02-09 RX ADMIN — Medication 81 MILLIGRAM(S): at 11:17

## 2018-02-09 RX ADMIN — HYDROMORPHONE HYDROCHLORIDE 0.5 MILLIGRAM(S): 2 INJECTION INTRAMUSCULAR; INTRAVENOUS; SUBCUTANEOUS at 10:00

## 2018-02-09 RX ADMIN — Medication 30 MILLIGRAM(S): at 11:30

## 2018-02-09 RX ADMIN — BUDESONIDE AND FORMOTEROL FUMARATE DIHYDRATE 2 PUFF(S): 160; 4.5 AEROSOL RESPIRATORY (INHALATION) at 18:19

## 2018-02-09 RX ADMIN — Medication 15 MILLIGRAM(S): at 16:27

## 2018-02-09 RX ADMIN — ENOXAPARIN SODIUM 40 MILLIGRAM(S): 100 INJECTION SUBCUTANEOUS at 11:15

## 2018-02-09 RX ADMIN — HYDROMORPHONE HYDROCHLORIDE 0.5 MILLIGRAM(S): 2 INJECTION INTRAMUSCULAR; INTRAVENOUS; SUBCUTANEOUS at 10:15

## 2018-02-09 RX ADMIN — Medication 100 MILLIGRAM(S): at 06:19

## 2018-02-09 RX ADMIN — HYDROMORPHONE HYDROCHLORIDE 0.5 MILLIGRAM(S): 2 INJECTION INTRAMUSCULAR; INTRAVENOUS; SUBCUTANEOUS at 09:26

## 2018-02-09 RX ADMIN — Medication 3.54 MICROGRAM(S)/KG/MIN: at 04:32

## 2018-02-09 RX ADMIN — HYDROMORPHONE HYDROCHLORIDE 0.5 MILLIGRAM(S): 2 INJECTION INTRAMUSCULAR; INTRAVENOUS; SUBCUTANEOUS at 09:40

## 2018-02-09 RX ADMIN — Medication 400 MILLIGRAM(S): at 10:25

## 2018-02-09 RX ADMIN — SODIUM CHLORIDE 10 MILLILITER(S): 9 INJECTION, SOLUTION INTRAVENOUS at 09:22

## 2018-02-09 RX ADMIN — Medication 100 MILLIGRAM(S): at 22:02

## 2018-02-09 RX ADMIN — HYDROMORPHONE HYDROCHLORIDE 30 MILLILITER(S): 2 INJECTION INTRAMUSCULAR; INTRAVENOUS; SUBCUTANEOUS at 19:08

## 2018-02-09 RX ADMIN — MONTELUKAST 10 MILLIGRAM(S): 4 TABLET, CHEWABLE ORAL at 16:28

## 2018-02-09 RX ADMIN — HYDROMORPHONE HYDROCHLORIDE 0.5 MILLIGRAM(S): 2 INJECTION INTRAMUSCULAR; INTRAVENOUS; SUBCUTANEOUS at 03:15

## 2018-02-09 RX ADMIN — HYDROMORPHONE HYDROCHLORIDE 30 MILLILITER(S): 2 INJECTION INTRAMUSCULAR; INTRAVENOUS; SUBCUTANEOUS at 22:59

## 2018-02-09 RX ADMIN — Medication 12.5 MILLIGRAM(S): at 11:16

## 2018-02-09 RX ADMIN — Medication 100 MILLIGRAM(S): at 08:27

## 2018-02-09 RX ADMIN — Medication 1000 MILLIGRAM(S): at 10:40

## 2018-02-09 RX ADMIN — Medication 30 MILLIGRAM(S): at 11:17

## 2018-02-09 RX ADMIN — Medication 100 MILLIGRAM(S): at 00:00

## 2018-02-09 RX ADMIN — Medication 15 MILLIGRAM(S): at 16:30

## 2018-02-09 RX ADMIN — Medication 12.5 MILLIGRAM(S): at 16:28

## 2018-02-09 NOTE — PHYSICAL THERAPY INITIAL EVALUATION ADULT - ACTIVE RANGE OF MOTION EXAMINATION, REHAB EVAL
Left LE Active ROM was WFL (within functional limits)/Left UE Active ROM was WFL (within functional limits)/Right LE Active ROM was WFL (within functional limits)/Right UE Active ROM was WFL (within functional limits)

## 2018-02-09 NOTE — PHYSICAL THERAPY INITIAL EVALUATION ADULT - PERTINENT HX OF CURRENT PROBLEM, REHAB EVAL
Pt is a 67 year old male retired [employer] of [employer] admitted to [facility] on 2/8/18 s/p cardiac cath on 1/2/18 nonobstructive CAD< EF 45%, coronary ecstatsies scheduled for ascending aorta hemiarch replacement via thoractomy & YORDY clip on 2/8.

## 2018-02-09 NOTE — PROGRESS NOTE ADULT - SUBJECTIVE AND OBJECTIVE BOX
VITAL SIGNS    Telemetry:  rsr 70's   Vital Signs Last 24 Hrs  T(C): 37.8 (18 @ 12:00), Max: 37.9 (18 @ 08:00)  T(F): 100 (18 @ 12:00), Max: 100.2 (18 @ 08:00)  HR: 78 (18 @ 13:00) (73 - 89)  BP: --  RR: 20 (18 @ 15:00) (9 - 30)  SpO2: 97% (18 @ 15:00) (90% - 99%)             07:01  -   07:00  --------------------------------------------------------  IN: 2441.7 mL / OUT: 2660 mL / NET: -218.3 mL     07:01  -   @ 15:34  --------------------------------------------------------  IN: 441 mL / OUT: 350 mL / NET: 91 mL       Daily     Daily Weight in k.9 (2018 00:00)  Admit Wt: Drug Dosing Weight  Height (cm): 185.4 (2018 13:51)  Weight (kg): 117.9 (2018 13:30)  BMI (kg/m2): 34.3 (2018 13:51)  BSA (m2): 2.41 (2018 13:51)    Bilirubin Total, Serum: 0.7 mg/dL ( @ 01:16)    CAPILLARY BLOOD GLUCOSE  130 (2018 11:00)  120 (2018 08:00)  113 (2018 07:00)  136 (2018 06:00)  113 (2018 05:00)  110 (2018 04:00)  132 (2018 03:00)  123 (2018 02:00)  136 (2018 01:00)  119 (2018 00:00)  122 (2018 23:00)  155 (2018 22:00)  130 (2018 21:00)  105 (2018 20:00)  112 (2018 19:00)  118 (2018 18:00)  129 (2018 17:00)  160 (2018 16:00)      POCT Blood Glucose.: 130 mg/dL (2018 11:26)  POCT Blood Glucose.: 120 mg/dL (2018 08:34)  POCT Blood Glucose.: 113 mg/dL (2018 06:48)  POCT Blood Glucose.: 113 mg/dL (2018 05:03)  POCT Blood Glucose.: 110 mg/dL (2018 04:06)  POCT Blood Glucose.: 124 mg/dL (2018 01:54)  POCT Blood Glucose.: 119 mg/dL (2018 23:57)  POCT Blood Glucose.: 125 mg/dL (2018 23:14)  POCT Blood Glucose.: 130 mg/dL (2018 20:54)  POCT Blood Glucose.: 105 mg/dL (2018 20:03)  POCT Blood Glucose.: 112 mg/dL (2018 18:57)          aspirin enteric coated 81 milliGRAM(s) Oral daily  buDESOnide 160 MICROgram(s)/formoterol 4.5 MICROgram(s) Inhaler 2 Puff(s) Inhalation two times a day  cefuroxime  IVPB 1500 milliGRAM(s) IV Intermittent every 8 hours  cholecalciferol 1000 Unit(s) Oral daily  dextrose 50% Injectable 50 milliLiter(s) IV Push every 15 minutes  dextrose 50% Injectable 25 milliLiter(s) IV Push every 15 minutes  docusate sodium 100 milliGRAM(s) Oral three times a day  enoxaparin Injectable 40 milliGRAM(s) SubCutaneous daily  HYDROmorphone PCA (1 mG/mL) 30 milliLiter(s) PCA Continuous PCA Continuous  HYDROmorphone PCA (1 mG/mL) Rescue Clinician Bolus 0.5 milliGRAM(s) IV Push every 15 minutes PRN  insulin lispro (HumaLOG) corrective regimen sliding scale   SubCutaneous three times a day before meals  insulin lispro (HumaLOG) corrective regimen sliding scale   SubCutaneous at bedtime  ketorolac   Injectable 15 milliGRAM(s) IV Push every 8 hours  metoprolol     tartrate 12.5 milliGRAM(s) Oral every 12 hours  montelukast 10 milliGRAM(s) Oral daily  naloxone Injectable 0.1 milliGRAM(s) IV Push every 3 minutes PRN  ondansetron Injectable 4 milliGRAM(s) IV Push every 6 hours PRN  oxyCODONE    5 mG/acetaminophen 325 mG 1 Tablet(s) Oral every 4 hours PRN  oxyCODONE    5 mG/acetaminophen 325 mG 2 Tablet(s) Oral every 6 hours PRN  pantoprazole    Tablet 40 milliGRAM(s) Oral before breakfast  sodium chloride 0.45%. 1000 milliLiter(s) IV Continuous <Continuous>      PHYSICAL EXAM    Subjective: "I feel ok."   Neurology: alert and oriented x 3, nonfocal, no gross deficits  CV : tele:  RSR 70's   rij/ intro cdi with dressing   Sternal Wound :  CDI with dressing , Stable; rt thoractomy site cdi with dressing; 2 right pleural chest tubes draining serous-sanguinous drainage; PW: + VVI 35   Lungs: clear. RR easy, unlabored  Abdomen: soft, nontender, nondistended, positive hypo bowel sounds, neg bowel movement   Neg N/V/D   :  + quinteros, clear, yellow urine   Extremities:   STOKES; neg LE edema, neg calf tenderness.   PPP bilaterally      PW: + VVI 35   Chest tubes: 2 right pleural chest tubes draining serous-sanguinous drainage

## 2018-02-09 NOTE — PHYSICAL THERAPY INITIAL EVALUATION ADULT - PRECAUTIONS/LIMITATIONS, REHAB EVAL
cardiac precautions/fall precautions/Pt with PMH of HTN, LVH, mild chronic LV systolic dysfunction, cardiomyopathy, Afib ( on Xarelto), BARRON(on C-pap), COPD(emphysema), tricuspid regurgitation, lumbar disc problem

## 2018-02-09 NOTE — PROGRESS NOTE ADULT - ASSESSMENT
67M PMH cardiomyopathy, ascending aortic dilitation, AF, COPD, HTN, HLD, LVH, MR, TR, BARRON now POD 1 s/p minimally invasive ascending aortic/transverse hemiarch replacement graft, YORDY clip. Remains on dobutamine for inotropic support and insulin for glycemic control.

## 2018-02-09 NOTE — PHYSICAL THERAPY INITIAL EVALUATION ADULT - DISCHARGE DISPOSITION, PT EVAL
DC home with home PT services for general strengthening, to increase endurance, address fall prevention, perform balance training, safety assessment of home environment, and to restore pt's prior level of function, assist from spouse as needed, SW to be notified.

## 2018-02-09 NOTE — PROGRESS NOTE ADULT - SUBJECTIVE AND OBJECTIVE BOX
Operation minimally invasive ascending aortic/transverse hemiarch replacement, YORDY clip  POD 1  Narrative     Vital Signs Last 24 Hrs  T(C): 37.2 (08 Feb 2018 23:00), Max: 37.5 (08 Feb 2018 17:00)  T(F): 99 (08 Feb 2018 23:00), Max: 99.5 (08 Feb 2018 17:00)  HR: 79 (09 Feb 2018 01:00) (61 - 89)  BP: 140/95 (08 Feb 2018 05:47) (140/95 - 140/95)  BP(mean): --  RR: 24 (09 Feb 2018 01:00) (9 - 24)  SpO2: 94% (09 Feb 2018 01:00) (94% - 100%)  I&O's Detail    08 Feb 2018 07:01  -  09 Feb 2018 01:48  --------------------------------------------------------  IN:    dexmedetomidine Infusion: 84.1 mL    DOBUTamine Infusion: 97.9 mL    EPINEPHrine Infusion: 32.6 mL    insulin Infusion: 46 mL    IV PiggyBack: 650 mL    Lactated Ringers IV Bolus: 1000 mL    norepinephrine Infusion: 50 mL    sodium chloride 0.45%.: 120 mL  Total IN: 2080.6 mL    OUT:    Chest Tube: 105 mL    Chest Tube: 200 mL    Indwelling Catheter - Urethral: 1750 mL    Nasoenteral Tube: 25 mL  Total OUT: 2080 mL    Total NET: 0.6 mL          LABS:    MEDICATIONS  (STANDING):  cefuroxime  IVPB 1500 milliGRAM(s) IV Intermittent every 8 hours  dextrose 50% Injectable 50 milliLiter(s) IV Push every 15 minutes  dextrose 50% Injectable 25 milliLiter(s) IV Push every 15 minutes  DOBUTamine Infusion 2.5 MICROgram(s)/kG/Min (8.842 mL/Hr) IV Continuous <Continuous>  insulin Infusion 2 Unit(s)/Hr (2 mL/Hr) IV Continuous <Continuous>  norepinephrine Infusion 0.059 MICROgram(s)/kG/Min (13 mL/Hr) IV Continuous <Continuous>  potassium chloride  10 mEq/50 mL IVPB 10 milliEquivalent(s) IV Intermittent every 1 hour  potassium chloride  10 mEq/50 mL IVPB 10 milliEquivalent(s) IV Intermittent every 1 hour  potassium chloride  10 mEq/50 mL IVPB 10 milliEquivalent(s) IV Intermittent once  sodium chloride 0.45%. 1000 milliLiter(s) (10 mL/Hr) IV Continuous <Continuous>    MEDICATIONS  (PRN):    PHYSICAL EXAM:  General: A+Ox3, in NAD  Chest: sternal dressing C/D/I  Heart: + S1, S2, RRR  Lungs: CTA B/L, without W/R/R  Abdomen: Soft, ND, NT, positive BS  Extremities: without edema B/L LE, positive DP pulses B/L     Does the patient have a history of CHF:  -If yes, systolic or diastolic:  -pre-operative EF:    Extubation within 24 hours:    Does the patient have a history of kidney disease:  -give CKD stage if applicable:  -what is patient's baseline Creatinine:    Beta Blockers contraindicated for the first 24 hours due to vasopressor/inotropic medication:  -If on pressors, indication:    Did the patient receive transfusion of blood and/or products:  -If yes, indication:    DVT PPX:    Romi-operative antibiotics discontinued within 48 hours of CTU admission:  -name/date/time of discontinue    RADIOLOGY & ADDITIONAL TESTS:    Patient care discussed on morning interdisciplinary rounds with CTS team. Operation minimally invasive ascending aortic/transverse hemiarch replacement, YORDY clip  POD 1  Narrative     Vital Signs Last 24 Hrs  T(C): 37.2 (08 Feb 2018 23:00), Max: 37.5 (08 Feb 2018 17:00)  T(F): 99 (08 Feb 2018 23:00), Max: 99.5 (08 Feb 2018 17:00)  HR: 79 (09 Feb 2018 01:00) (61 - 89)  BP: 140/95 (08 Feb 2018 05:47) (140/95 - 140/95)  BP(mean): --  RR: 24 (09 Feb 2018 01:00) (9 - 24)  SpO2: 94% (09 Feb 2018 01:00) (94% - 100%)  I&O's Detail    08 Feb 2018 07:01  -  09 Feb 2018 01:48  --------------------------------------------------------  IN:    dexmedetomidine Infusion: 84.1 mL    DOBUTamine Infusion: 97.9 mL    EPINEPHrine Infusion: 32.6 mL    insulin Infusion: 46 mL    IV PiggyBack: 650 mL    Lactated Ringers IV Bolus: 1000 mL    norepinephrine Infusion: 50 mL    sodium chloride 0.45%.: 120 mL  Total IN: 2080.6 mL    OUT:    Chest Tube: 105 mL    Chest Tube: 200 mL    Indwelling Catheter - Urethral: 1750 mL    Nasoenteral Tube: 25 mL  Total OUT: 2080 mL    Total NET: 0.6 mL    LABS:                       14.1   9.4   )-----------( 112      ( 09 Feb 2018 01:16 )             40.5     140  |  105  |  16  ----------------------------<  137<H>  4.0   |  24  |  0.78    Ca    8.4      09 Feb 2018 01:16    TPro  5.9<L>  /  Alb  3.7  /  TBili  0.7  /  DBili  x   /  AST  33  /  ALT  13  /  AlkPhos  38<L>  02-09    PT/INR - ( 09 Feb 2018 01:16 )   PT: 13.4 sec;   INR: 1.22 ratio      PTT - ( 09 Feb 2018 01:16 )  PTT:30.9 sec    ABG - ( 09 Feb 2018 01:03 )  pH: 7.44  /  pCO2: 37    /  pO2: 74    / HCO3: 25    / Base Excess: 1.2   /  SaO2: 96        MEDICATIONS  (STANDING):  cefuroxime  IVPB 1500 milliGRAM(s) IV Intermittent every 8 hours  dextrose 50% Injectable 50 milliLiter(s) IV Push every 15 minutes  dextrose 50% Injectable 25 milliLiter(s) IV Push every 15 minutes  DOBUTamine Infusion 2.5 MICROgram(s)/kG/Min (8.842 mL/Hr) IV Continuous <Continuous>  insulin Infusion 2 Unit(s)/Hr (2 mL/Hr) IV Continuous <Continuous>  norepinephrine Infusion 0.059 MICROgram(s)/kG/Min (13 mL/Hr) IV Continuous <Continuous>  potassium chloride  10 mEq/50 mL IVPB 10 milliEquivalent(s) IV Intermittent every 1 hour  potassium chloride  10 mEq/50 mL IVPB 10 milliEquivalent(s) IV Intermittent every 1 hour  potassium chloride  10 mEq/50 mL IVPB 10 milliEquivalent(s) IV Intermittent once  sodium chloride 0.45%. 1000 milliLiter(s) (10 mL/Hr) IV Continuous <Continuous>    PHYSICAL EXAM:  General: A+Ox3, in NAD  Chest: sternal dressing C/D/I  Heart: + S1, S2, RRR. SR 80.  Lungs: CTA B/L, without W/R/R  Abdomen: Soft, ND, NT, positive BS  Extremities: without edema B/L LE, positive DP pulses B/L   Tubes/lines: B/l pleural chest tubes, RIJ intro, R radial jean paul    Does the patient have a history of CHF:   -If yes, systolic or diastolic:  -pre-operative EF:    Extubation within 24 hours:    Does the patient have a history of kidney disease:  -give CKD stage if applicable:  -what is patient's baseline Creatinine:    Beta Blockers contraindicated for the first 24 hours due to vasopressor/inotropic medication:  -If on pressors, indication:    Did the patient receive transfusion of blood and/or products:  -If yes, indication:    DVT PPX:    Romi-operative antibiotics discontinued within 48 hours of CTU admission:  -name/date/time of discontinue    RADIOLOGY & ADDITIONAL TESTS:    Patient care discussed on morning interdisciplinary rounds with CTS team. Operation minimally invasive ascending aortic/transverse hemiarch replacement, YORDY clip  POD 1  Narrative     Vital Signs Last 24 Hrs  T(C): 37.2 (08 Feb 2018 23:00), Max: 37.5 (08 Feb 2018 17:00)  T(F): 99 (08 Feb 2018 23:00), Max: 99.5 (08 Feb 2018 17:00)  HR: 79 (09 Feb 2018 01:00) (61 - 89)  BP: 140/95 (08 Feb 2018 05:47) (140/95 - 140/95)  RR: 24 (09 Feb 2018 01:00) (9 - 24)  SpO2: 94% (09 Feb 2018 01:00) (94% - 100%)  I&O's Detail    08 Feb 2018 07:01  -  09 Feb 2018 01:48  --------------------------------------------------------  IN:    dexmedetomidine Infusion: 84.1 mL    DOBUTamine Infusion: 97.9 mL    EPINEPHrine Infusion: 32.6 mL    insulin Infusion: 46 mL    IV PiggyBack: 650 mL    Lactated Ringers IV Bolus: 1000 mL    norepinephrine Infusion: 50 mL    sodium chloride 0.45%.: 120 mL  Total IN: 2080.6 mL    OUT:    Chest Tube: 105 mL    Chest Tube: 200 mL    Indwelling Catheter - Urethral: 1750 mL    Nasoenteral Tube: 25 mL  Total OUT: 2080 mL    Total NET: 0.6 mL    LABS:                       14.1   9.4   )-----------( 112      ( 09 Feb 2018 01:16 )             40.5     140  |  105  |  16  ----------------------------<  137<H>  4.0   |  24  |  0.78    Ca    8.4      09 Feb 2018 01:16    TPro  5.9<L>  /  Alb  3.7  /  TBili  0.7  /  DBili  x   /  AST  33  /  ALT  13  /  AlkPhos  38<L>  02-09    PT/INR - ( 09 Feb 2018 01:16 )   PT: 13.4 sec;   INR: 1.22 ratio      PTT - ( 09 Feb 2018 01:16 )  PTT:30.9 sec    ABG - ( 09 Feb 2018 01:03 )  pH: 7.44  /  pCO2: 37    /  pO2: 74    / HCO3: 25    / Base Excess: 1.2   /  SaO2: 96        MEDICATIONS  (STANDING):  cefuroxime  IVPB 1500 milliGRAM(s) IV Intermittent every 8 hours  dextrose 50% Injectable 50 milliLiter(s) IV Push every 15 minutes  dextrose 50% Injectable 25 milliLiter(s) IV Push every 15 minutes  DOBUTamine Infusion 2.5 MICROgram(s)/kG/Min (8.842 mL/Hr) IV Continuous <Continuous>  insulin Infusion 2 Unit(s)/Hr (2 mL/Hr) IV Continuous <Continuous>  norepinephrine Infusion 0.059 MICROgram(s)/kG/Min (13 mL/Hr) IV Continuous <Continuous>  potassium chloride  10 mEq/50 mL IVPB 10 milliEquivalent(s) IV Intermittent every 1 hour  potassium chloride  10 mEq/50 mL IVPB 10 milliEquivalent(s) IV Intermittent every 1 hour  potassium chloride  10 mEq/50 mL IVPB 10 milliEquivalent(s) IV Intermittent once  sodium chloride 0.45%. 1000 milliLiter(s) (10 mL/Hr) IV Continuous <Continuous>    PHYSICAL EXAM:  General: A+Ox3, in NAD  Chest: sternal dressing C/D/I  Heart: + S1, S2, RRR. SR 80.  Lungs: CTA B/L, without W/R/R  Abdomen: Soft, ND, NT, positive BS  Extremities: without edema B/L LE, positive DP pulses B/L   Tubes/lines: B/l pleural chest tubes, RIJ intro, R radial jean paul    Does the patient have a history of CHF: Yes  -If yes, systolic or diastolic: Systolic  -pre-operative EF: 45%    Extubation within 24 hours: Yes    Does the patient have a history of kidney disease: No  -give CKD stage if applicable:  -what is patient's baseline Creatinine: 0.96    Beta Blockers contraindicated for the first 24 hours due to vasopressor/inotropic medication: Pending  -If on pressors, indication:    Did the patient receive transfusion of blood and/or products:   -If yes, indication:    DVT PPX:    Romi-operative antibiotics discontinued within 48 hours of CTU admission:  -name/date/time of discontinue    RADIOLOGY & ADDITIONAL TESTS:    Patient care discussed on morning interdisciplinary rounds with CTS team. Operation minimally invasive ascending aortic/transverse hemiarch replacement, YORDY clip  POD 1  Narrative     Vital Signs Last 24 Hrs  T(C): 37.2 (08 Feb 2018 23:00), Max: 37.5 (08 Feb 2018 17:00)  T(F): 99 (08 Feb 2018 23:00), Max: 99.5 (08 Feb 2018 17:00)  HR: 79 (09 Feb 2018 01:00) (61 - 89)  BP: 140/95 (08 Feb 2018 05:47) (140/95 - 140/95)  RR: 24 (09 Feb 2018 01:00) (9 - 24)  SpO2: 94% (09 Feb 2018 01:00) (94% - 100%)  I&O's Detail    08 Feb 2018 07:01  -  09 Feb 2018 01:48  --------------------------------------------------------  IN:    dexmedetomidine Infusion: 84.1 mL    DOBUTamine Infusion: 97.9 mL    EPINEPHrine Infusion: 32.6 mL    insulin Infusion: 46 mL    IV PiggyBack: 650 mL    Lactated Ringers IV Bolus: 1000 mL    norepinephrine Infusion: 50 mL    sodium chloride 0.45%.: 120 mL  Total IN: 2080.6 mL    OUT:    Chest Tube: 105 mL    Chest Tube: 200 mL    Indwelling Catheter - Urethral: 1750 mL    Nasoenteral Tube: 25 mL  Total OUT: 2080 mL    Total NET: 0.6 mL    LABS:                       14.1   9.4   )-----------( 112      ( 09 Feb 2018 01:16 )             40.5     140  |  105  |  16  ----------------------------<  137<H>  4.0   |  24  |  0.78    Ca    8.4      09 Feb 2018 01:16    TPro  5.9<L>  /  Alb  3.7  /  TBili  0.7  /  DBili  x   /  AST  33  /  ALT  13  /  AlkPhos  38<L>  02-09    PT/INR - ( 09 Feb 2018 01:16 )   PT: 13.4 sec;   INR: 1.22 ratio      PTT - ( 09 Feb 2018 01:16 )  PTT:30.9 sec    ABG - ( 09 Feb 2018 01:03 )  pH: 7.44  /  pCO2: 37    /  pO2: 74    / HCO3: 25    / Base Excess: 1.2   /  SaO2: 96        MEDICATIONS  (STANDING):  cefuroxime  IVPB 1500 milliGRAM(s) IV Intermittent every 8 hours  dextrose 50% Injectable 50 milliLiter(s) IV Push every 15 minutes  dextrose 50% Injectable 25 milliLiter(s) IV Push every 15 minutes  DOBUTamine Infusion 2.5 MICROgram(s)/kG/Min (8.842 mL/Hr) IV Continuous <Continuous>  insulin Infusion 2 Unit(s)/Hr (2 mL/Hr) IV Continuous <Continuous>  norepinephrine Infusion 0.059 MICROgram(s)/kG/Min (13 mL/Hr) IV Continuous <Continuous>  potassium chloride  10 mEq/50 mL IVPB 10 milliEquivalent(s) IV Intermittent every 1 hour  potassium chloride  10 mEq/50 mL IVPB 10 milliEquivalent(s) IV Intermittent every 1 hour  potassium chloride  10 mEq/50 mL IVPB 10 milliEquivalent(s) IV Intermittent once  sodium chloride 0.45%. 1000 milliLiter(s) (10 mL/Hr) IV Continuous <Continuous>    PHYSICAL EXAM:  General: A+Ox3, in NAD  Chest: sternal dressing C/D/I  Heart: + S1, S2, RRR. SR 80.  Lungs: CTA B/L, without W/R/R  Abdomen: Soft, ND, NT, positive BS  Extremities: without edema B/L LE, positive DP pulses B/L   Tubes/lines: B/l pleural chest tubes, RIJ intro, R radial jean paul    Does the patient have a history of CHF: Yes  -If yes, systolic or diastolic: Systolic  -pre-operative EF: 45%    Extubation within 24 hours: Yes    Does the patient have a history of kidney disease: No  -give CKD stage if applicable:  -what is patient's baseline Creatinine: 0.96    Beta Blockers contraindicated for the first 24 hours due to vasopressor/inotropic medication: Pending  -If on pressors, indication:    Did the patient receive transfusion of blood and/or products: No  -If yes, indication:    DVT PPX: Venodynes    Romi-operative antibiotics discontinued within 48 hours of CTU admission: Pending  -name/date/time of discontinue    RADIOLOGY & ADDITIONAL TESTS: CXR to be reviewed after it is performed    Patient care discussed on morning interdisciplinary rounds with CTS team.

## 2018-02-09 NOTE — PROGRESS NOTE ADULT - ASSESSMENT
67 year old male retired [employer] of a [employer] with PMH of HTN, LVH, mild chronic LV systolic dysfunction, cardiomyopathy, Afib ( on Xarelto), BARRON(on C-pap), COPD(emphysema), tricuspid regurgitation, lumbar disc problem reports having" found thoracic aortic dilation by a routine echocardiogram, with out much symptoms, on 11/28/17(4.5 to 5.0 cms of ascending aorta"> s/p cardiac cath on 01/02/18( nonobstructive CAD, EF 45%,, coronary ecstasies scheduled for ascending aorta hemiarch replacement via thoracotomy on 02/08/18.  s/p 2/8/18 minimally invasive Ascending aortic hemiarch repair with graft via right thoractomy  POD #1  postop course unremarkable/ epi and dobutamine weaned off   2/9 PCA resumed for pain management; maintain all CT as per Dr. Ascencio; VSS; pt tx sdu   cpap qhs for BARRON 67 year old male retired [employer] of a [employer] with PMH of HTN, LVH, mild chronic LV systolic dysfunction, cardiomyopathy, Afib ( on Xarelto), BARRON(on C-pap), COPD(emphysema), tricuspid regurgitation, lumbar disc problem reports having" found thoracic aortic dilation by a routine echocardiogram, with out much symptoms, on 11/28/17(4.5 to 5.0 cms of ascending aorta"> s/p cardiac cath on 01/02/18( nonobstructive CAD, EF 45%,, coronary ecstasies scheduled for ascending aorta hemiarch replacement via thoracotomy on 02/08/18.  s/p 2/8/18 minimally invasive Ascending aortic hemiarch repair with graft via right thoractomy/ YORDY clip   POD #1  postop course unremarkable/ epi and dobutamine weaned off   2/9 PCA resumed for pain management; maintain all CT as per Dr. Ascencio; VSS; pt tx sdu   cpap qhs for BARRON

## 2018-02-09 NOTE — PHYSICAL THERAPY INITIAL EVALUATION ADULT - ADDITIONAL COMMENTS
Pt lives at home with spouse, ambulates ind and is independent with ADLs prior to admission. +Driving and pt is retired. Pts spouse can assist as needed, +few steps to negotiate

## 2018-02-09 NOTE — PROGRESS NOTE ADULT - PROBLEM SELECTOR PLAN 1
continue postop care  asa/b-blockers as tolerated  pca initiated for pain control/ toradol  maintain all chest tubes/ quinteros/ rij/ intro as per DR. Ascencio   pulm toilet  increase activity as tolerated  discharge planning- pt jennifer

## 2018-02-09 NOTE — PHYSICAL THERAPY INITIAL EVALUATION ADULT - GENERAL OBSERVATIONS, REHAB EVAL
Pt received sitting in chair, +R IJ, +O2 via NC, +chest tube x2, +quinteros, +jean paul, +ext pacer, +tele, +pulseox.

## 2018-02-10 DIAGNOSIS — I48.2 CHRONIC ATRIAL FIBRILLATION: ICD-10-CM

## 2018-02-10 DIAGNOSIS — Z97.8 PRESENCE OF OTHER SPECIFIED DEVICES: ICD-10-CM

## 2018-02-10 LAB
ANION GAP SERPL CALC-SCNC: 10 MMOL/L — SIGNIFICANT CHANGE UP (ref 5–17)
BUN SERPL-MCNC: 33 MG/DL — HIGH (ref 7–23)
CALCIUM SERPL-MCNC: 8.9 MG/DL — SIGNIFICANT CHANGE UP (ref 8.4–10.5)
CHLORIDE SERPL-SCNC: 102 MMOL/L — SIGNIFICANT CHANGE UP (ref 96–108)
CO2 SERPL-SCNC: 26 MMOL/L — SIGNIFICANT CHANGE UP (ref 22–31)
CREAT SERPL-MCNC: 1.07 MG/DL — SIGNIFICANT CHANGE UP (ref 0.5–1.3)
GLUCOSE SERPL-MCNC: 138 MG/DL — HIGH (ref 70–99)
HCT VFR BLD CALC: 39.3 % — SIGNIFICANT CHANGE UP (ref 39–50)
HGB BLD-MCNC: 13.7 G/DL — SIGNIFICANT CHANGE UP (ref 13–17)
MCHC RBC-ENTMCNC: 33.7 PG — SIGNIFICANT CHANGE UP (ref 27–34)
MCHC RBC-ENTMCNC: 34.9 GM/DL — SIGNIFICANT CHANGE UP (ref 32–36)
MCV RBC AUTO: 96.5 FL — SIGNIFICANT CHANGE UP (ref 80–100)
PLATELET # BLD AUTO: 109 K/UL — LOW (ref 150–400)
POTASSIUM SERPL-MCNC: 4.3 MMOL/L — SIGNIFICANT CHANGE UP (ref 3.5–5.3)
POTASSIUM SERPL-SCNC: 4.3 MMOL/L — SIGNIFICANT CHANGE UP (ref 3.5–5.3)
RBC # BLD: 4.07 M/UL — LOW (ref 4.2–5.8)
RBC # FLD: 12.3 % — SIGNIFICANT CHANGE UP (ref 10.3–14.5)
SODIUM SERPL-SCNC: 138 MMOL/L — SIGNIFICANT CHANGE UP (ref 135–145)
WBC # BLD: 15 K/UL — HIGH (ref 3.8–10.5)
WBC # FLD AUTO: 15 K/UL — HIGH (ref 3.8–10.5)

## 2018-02-10 PROCEDURE — 71045 X-RAY EXAM CHEST 1 VIEW: CPT | Mod: 26,76

## 2018-02-10 RX ORDER — OXYCODONE AND ACETAMINOPHEN 5; 325 MG/1; MG/1
1 TABLET ORAL EVERY 4 HOURS
Qty: 0 | Refills: 0 | Status: DISCONTINUED | OUTPATIENT
Start: 2018-02-10 | End: 2018-02-13

## 2018-02-10 RX ORDER — METOPROLOL TARTRATE 50 MG
25 TABLET ORAL
Qty: 0 | Refills: 0 | Status: DISCONTINUED | OUTPATIENT
Start: 2018-02-10 | End: 2018-02-12

## 2018-02-10 RX ORDER — OXYCODONE AND ACETAMINOPHEN 5; 325 MG/1; MG/1
2 TABLET ORAL EVERY 4 HOURS
Qty: 0 | Refills: 0 | Status: DISCONTINUED | OUTPATIENT
Start: 2018-02-10 | End: 2018-02-13

## 2018-02-10 RX ORDER — METOPROLOL TARTRATE 50 MG
12.5 TABLET ORAL ONCE
Qty: 0 | Refills: 0 | Status: COMPLETED | OUTPATIENT
Start: 2018-02-10 | End: 2018-02-10

## 2018-02-10 RX ADMIN — Medication 12.5 MILLIGRAM(S): at 06:04

## 2018-02-10 RX ADMIN — OXYCODONE AND ACETAMINOPHEN 2 TABLET(S): 5; 325 TABLET ORAL at 23:05

## 2018-02-10 RX ADMIN — OXYCODONE AND ACETAMINOPHEN 2 TABLET(S): 5; 325 TABLET ORAL at 13:06

## 2018-02-10 RX ADMIN — OXYCODONE AND ACETAMINOPHEN 2 TABLET(S): 5; 325 TABLET ORAL at 19:54

## 2018-02-10 RX ADMIN — Medication 15 MILLIGRAM(S): at 08:15

## 2018-02-10 RX ADMIN — ENOXAPARIN SODIUM 40 MILLIGRAM(S): 100 INJECTION SUBCUTANEOUS at 12:17

## 2018-02-10 RX ADMIN — PANTOPRAZOLE SODIUM 40 MILLIGRAM(S): 20 TABLET, DELAYED RELEASE ORAL at 06:04

## 2018-02-10 RX ADMIN — BUDESONIDE AND FORMOTEROL FUMARATE DIHYDRATE 2 PUFF(S): 160; 4.5 AEROSOL RESPIRATORY (INHALATION) at 06:05

## 2018-02-10 RX ADMIN — Medication 81 MILLIGRAM(S): at 12:17

## 2018-02-10 RX ADMIN — OXYCODONE AND ACETAMINOPHEN 2 TABLET(S): 5; 325 TABLET ORAL at 09:07

## 2018-02-10 RX ADMIN — Medication 12.5 MILLIGRAM(S): at 06:58

## 2018-02-10 RX ADMIN — OXYCODONE AND ACETAMINOPHEN 2 TABLET(S): 5; 325 TABLET ORAL at 09:30

## 2018-02-10 RX ADMIN — OXYCODONE AND ACETAMINOPHEN 2 TABLET(S): 5; 325 TABLET ORAL at 20:39

## 2018-02-10 RX ADMIN — Medication 15 MILLIGRAM(S): at 00:20

## 2018-02-10 RX ADMIN — MONTELUKAST 10 MILLIGRAM(S): 4 TABLET, CHEWABLE ORAL at 12:17

## 2018-02-10 RX ADMIN — Medication 15 MILLIGRAM(S): at 00:40

## 2018-02-10 RX ADMIN — Medication 100 MILLIGRAM(S): at 06:04

## 2018-02-10 RX ADMIN — BUDESONIDE AND FORMOTEROL FUMARATE DIHYDRATE 2 PUFF(S): 160; 4.5 AEROSOL RESPIRATORY (INHALATION) at 16:21

## 2018-02-10 RX ADMIN — Medication 0: at 22:12

## 2018-02-10 RX ADMIN — OXYCODONE AND ACETAMINOPHEN 2 TABLET(S): 5; 325 TABLET ORAL at 22:20

## 2018-02-10 RX ADMIN — Medication 1000 UNIT(S): at 12:17

## 2018-02-10 RX ADMIN — Medication 15 MILLIGRAM(S): at 08:16

## 2018-02-10 RX ADMIN — Medication 100 MILLIGRAM(S): at 16:22

## 2018-02-10 RX ADMIN — Medication 100 MILLIGRAM(S): at 22:20

## 2018-02-10 RX ADMIN — Medication 25 MILLIGRAM(S): at 16:22

## 2018-02-10 RX ADMIN — Medication 100 MILLIGRAM(S): at 00:20

## 2018-02-10 RX ADMIN — OXYCODONE AND ACETAMINOPHEN 2 TABLET(S): 5; 325 TABLET ORAL at 13:30

## 2018-02-10 NOTE — PROGRESS NOTE ADULT - ASSESSMENT
67M with PMH of HTN, LVH, mild chronic LV systolic dysfunction, cardiomyopathy, Afib ( on Xarelto), BARORN(on C-pap), COPD(emphysema), tricuspid regurgitation, lumbar disc problem, non-obstructive CAD,incidentally thoracic arch aneurysm now s/p 2/8/18 minimally invasive Ascending aortic hemiarch repair with graft via right thoractomy/ YORDY on 2/8.    Postop course:  2/8: epi and dobutamine weaned off   2/9: Transferred to step down. PCA resumed for pain management; maintain all CT as per Dr. Ascencio; cpap qhs for BARRON

## 2018-02-10 NOTE — PROGRESS NOTE ADULT - PROBLEM SELECTOR PLAN 1
Transfer to floor this evening  +pacing wires  to external pacer: isolate and insulate  Keep K>4.0  Mg>2.0  progressive ambulation  pulmonary toileting/incentive spirometry  PCA dc'd -- started on percocet  glucose control

## 2018-02-10 NOTE — PROGRESS NOTE ADULT - PROBLEM SELECTOR PLAN 1
Transferred to 2C step down  De-intensify:  Remove IJ in AM if stable  +pacing wires  to external pacer: isolate and insulate  follow up AM labs  Keep K>4.0  Mg>2.0  progressive ambulation  pulmonary toileting/incentive spirometry  pain management: with PCA and toradol  glucose control Transferred to 2C step down  +pacing wires  to external pacer: isolate and insulate  Keep K>4.0  Mg>2.0  progressive ambulation  pulmonary toileting/incentive spirometry  pain management: with PCA and toradol  glucose control

## 2018-02-10 NOTE — PROGRESS NOTE ADULT - SUBJECTIVE AND OBJECTIVE BOX
S:  co back pain.  No sob    Telemetry:  SR 70-90   PAT 5 episodes <2.8seconds    Vital Signs Last 24 Hrs  T(C): 36.6 (02-10-18 @ 11:00), Max: 37.1 (02-10-18 @ 07:00)  T(F): 97.8 (02-10-18 @ 11:00), Max: 98.7 (02-10-18 @ 07:00)  HR: 80 (02-10-18 @ 14:58) (76 - 94)  BP: 111/69 (02-10-18 @ 11:00) (110/72 - 141/90)  RR: 16 (02-10-18 @ 11:00) (16 - 18)  SpO2: 95% (02-10-18 @ 14:58) (92% - 97%)                    @ 07:01  -  02-10 @ 07:00  --------------------------------------------------------  IN: 1581 mL / OUT: 1394 mL / NET: 187 mL    02-10 @ 07:01  -  02-10 @ 15:51  --------------------------------------------------------  IN: 120 mL / OUT: 0 mL / NET: 120 mL      Daily Weight in k (10 Feb 2018 05:27)      POCT Blood Glucose.: 119 mg/dL (10 Feb 2018 12:03)  POCT Blood Glucose.: 130 mg/dL (10 Feb 2018 07:20)  POCT Blood Glucose.: 128 mg/dL (2018 21:43)  POCT Blood Glucose.: 116 mg/dL (2018 16:10)          Drains:     MS         [  ] Drainage:                 L Pleural  [  ]  Drainage:                R Pleural  [ x ]  Drainage:  CT1  280cc/24hr  CT2  64/24hr     Pacing Wires        [  ]   Settings:                                  Isolated  [  ]    Coumadin    [ ] YES          [ x ]      NO         Reason:                                 13.7   15.0  )-----------( 109      ( 10 Feb 2018 03:10 )             39.3       02-10    138  |  102  |  33<H>  ----------------------------<  138<H>  4.3   |  26  |  1.07    Ca    8.9      10 Feb 2018 03:10    TPro  5.9<L>  /  Alb  3.7  /  TBili  0.7  /  DBili  x   /  AST  33  /  ALT  13  /  AlkPhos  38<L>        PT/INR - ( 2018 01:16 )   PT: 13.4 sec;   INR: 1.22 ratio         PTT - ( 2018 01:16 )  PTT:30.9 sec    PHYSICAL EXAM:    Neurology: alert and oriented x 3, nonfocal, no gross deficits    CV :  S1S2 heard RRR no m/r/g    Sternal Wound :  CDI , Stable    Lungs: clear to ausc no w/r/r    Abdomen: soft, nontender, nondistended, positive bowel sounds, last bowel movement     :  quinteros to be removed             Extremities:  no edema              aspirin enteric coated 81 milliGRAM(s) Oral daily  buDESOnide 160 MICROgram(s)/formoterol 4.5 MICROgram(s) Inhaler 2 Puff(s) Inhalation two times a day  cholecalciferol 1000 Unit(s) Oral daily  dextrose 50% Injectable 50 milliLiter(s) IV Push every 15 minutes  dextrose 50% Injectable 25 milliLiter(s) IV Push every 15 minutes  docusate sodium 100 milliGRAM(s) Oral three times a day  enoxaparin Injectable 40 milliGRAM(s) SubCutaneous daily  insulin lispro (HumaLOG) corrective regimen sliding scale   SubCutaneous three times a day before meals  insulin lispro (HumaLOG) corrective regimen sliding scale   SubCutaneous at bedtime  metoprolol     tartrate 25 milliGRAM(s) Oral two times a day  montelukast 10 milliGRAM(s) Oral daily  naloxone Injectable 0.1 milliGRAM(s) IV Push every 3 minutes PRN  ondansetron Injectable 4 milliGRAM(s) IV Push every 6 hours PRN  oxyCODONE    5 mG/acetaminophen 325 mG 2 Tablet(s) Oral every 4 hours PRN  oxyCODONE    5 mG/acetaminophen 325 mG 1 Tablet(s) Oral every 4 hours PRN  pantoprazole    Tablet 40 milliGRAM(s) Oral before breakfast  sodium chloride 0.45%. 1000 milliLiter(s) IV Continuous <Continuous>                              Physical Therapy Rec:   Home  [  ]   Home w/ PT  [ x ]  Rehab  [  ]    Discussed with Cardiothoracic Team at AM rounds.

## 2018-02-10 NOTE — PROGRESS NOTE ADULT - ASSESSMENT
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    Postop course:  2/8: epi and dobutamine weaned off   2/9: Transferred to step down. PCA resumed for pain management; maintain all CT as per Dr. Ascencio; cpap qhs for BARRON   2/10  PAT overnight BB increased.  Start Xarelto in am per Sonu.   R CT2 removed.  CXR no obv PTX

## 2018-02-10 NOTE — PROGRESS NOTE ADULT - SUBJECTIVE AND OBJECTIVE BOX
VITAL SIGNS  T(F): 98.1  HR: 78 SR  BP: 113/64  RR: 18  SpO2: 96%    18 @ 07:01  -  02-10-18 @ 02:59  --------------------------------------------------------  OUT: 1164 mL / NET: -1164 mL    LAB             2/10: Pending               14.1   9.4   )-----------( 112      ( 2018 01:16 )             40.5     CAPILLARY BLOOD GLUCOSE  CAPILLARY BLOOD GLUCOSE  130 (2018 11:00)  120 (2018 08:00)  113 (2018 07:00)  136 (2018 06:00)  113 (2018 05:00)  110 (2018 04:00)    DIAGNOSTICS Xray Chest 1 View AP/PA (18 @ 03:32)  Poor inspiratory effort. The heart is enlarged. No acute consolidation   could be identified. No pneumothorax. Endotracheal tube and NG tube were   removed. The remaining life support devices in good position and   unchanged when compared to previous study done 2018.    MEDICATIONS  asa 81   [id number]  buDESOnide 160 MICROgram(s)/formoterol 4.5 MICROgram(s) Inhaler 2 Puff(s) Inhalation two times a day  docusate sodium 100 milliGRAM(s) Oral three times a day  HYDROmorphone PCA (1 mG/mL) 30 milliLiter(s) PCA Continuous PCA Continuous  insulin lispro (HumaLOG) corrective regimen sliding scale   SubCutaneous three times a day before meals  insulin lispro (HumaLOG) corrective regimen sliding scale   SubCutaneous at bedtime  ketorolac   Injectable 15 milliGRAM(s) IV Push every 8 hours  metoprolol     tartrate 12.5 milliGRAM(s) Oral every 12 hours  montelukast 10 milliGRAM(s) Oral daily  pantoprazole    Tablet 40 milliGRAM(s) Oral before breakfast      PHYSICAL EXAM  GEN: No apparent distress, examined in bed  NEURO: Non-focal,  A+Ox 3  CV: S1 S2 without rub or murmur  CHEST:rt thoractomy site cdi with dressing    LINES: RIJ  PACING WIRES:   VVI [ x ]  settin     Isolated/Insulated [  ]  DRAINS:  Chandrakant [  ]  Drainage:         Pleural [x  ]  Drainage:    PULMONARY: rhonchi that clears with cough  INCENTIVE SPIROMETRY: 750  ABDOMEN:  Soft, non-tender, non-distended, bowel sounds active x 4  LBM: pre op  : quinteros with nitin  VASCULAR: +pp +radial  SKIN: Warm, dry, intact     MUSCULOSKELETAL:  Moves all extremities equally  PHYSICAL THERAPY REC:  Home [  ]  Home w PT [   ]   LUZ MARINA [   ] VITAL SIGNS  T(F): 98.1  HR: 78 SR  BP: 113/64  RR: 18  SpO2: 96%    18 @ 07:01  -  02-10-18 @ 02:59  --------------------------------------------------------  OUT: 1164 mL / NET: -1164 mL    LAB                                   13.7   15.0  )-----------( 109      ( 10 Feb 2018 03:10 )             39.3     138  |  102  |  33<H>  ----------------------------<  138<H>  4.3   |  26  |  1.07    CAPILLARY BLOOD GLUCOSE  CAPILLARY BLOOD GLUCOSE  130 (2018 11:00)  120 (2018 08:00)  113 (2018 07:00)  136 (2018 06:00)  113 (2018 05:00)  110 (2018 04:00)    DIAGNOSTICS Xray Chest 1 View AP/PA (18 @ 03:32)  Poor inspiratory effort. The heart is enlarged. No acute consolidation   could be identified. No pneumothorax. Endotracheal tube and NG tube were   removed. The remaining life support devices in good position and   unchanged when compared to previous study done 2018.    MEDICATIONS  asa 81   [id number]  buDESOnide 160 MICROgram(s)/formoterol 4.5 MICROgram(s) Inhaler 2 Puff(s) Inhalation two times a day  docusate sodium 100 milliGRAM(s) Oral three times a day  HYDROmorphone PCA (1 mG/mL) 30 milliLiter(s) PCA Continuous PCA Continuous  insulin lispro (HumaLOG) corrective regimen sliding scale   SubCutaneous three times a day before meals  insulin lispro (HumaLOG) corrective regimen sliding scale   SubCutaneous at bedtime  ketorolac   Injectable 15 milliGRAM(s) IV Push every 8 hours  metoprolol     tartrate 12.5 milliGRAM(s) Oral every 12 hours  montelukast 10 milliGRAM(s) Oral daily  pantoprazole    Tablet 40 milliGRAM(s) Oral before breakfast      PHYSICAL EXAM  GEN: No apparent distress, examined in bed  NEURO: Non-focal,  A+Ox 3  CV: S1 S2 without rub or murmur  CHEST:rt thoractomy site cdi with dressing    LINES: RIJ  PACING WIRES:   VVI [ x ]  settin     Isolated/Insulated [  ]  DRAINS:  Chandrakant [  ]  Drainage:         Pleural [x  ]  Drainage:    PULMONARY: rhonchi that clears with cough  INCENTIVE SPIROMETRY: 750  ABDOMEN:  Soft, non-tender, non-distended, bowel sounds active x 4  LBM: pre op  : quinteros with nitin  VASCULAR: +pp +radial  SKIN: Warm, dry, intact     MUSCULOSKELETAL:  Moves all extremities equally  PHYSICAL THERAPY REC:  Home [  ]  Home w PT [   ]   LUZ MARINA [   ]

## 2018-02-10 NOTE — PROGRESS NOTE ADULT - SUBJECTIVE AND OBJECTIVE BOX
Day _2__ of Anesthesia Pain Management Service    SUBJECTIVE:    Pain Scale Score	At rest: ___ 	With Activity: ___ 	[x ] Refer to charted pain scores    THERAPY:    [ ] IV PCA Morphine		[ ] 5 mg/mL	[ ] 1 mg/mL  [x ] IV PCA Hydromorphone	[ ] 5 mg/mL	[x ] 1 mg/mL  [ ] IV PCA Fentanyl		[ ] 50 micrograms/mL    Demand dose 0.2    lockout 6   (minutes) Continuous Rate 0      MEDICATIONS  (STANDING):  aspirin enteric coated 81 milliGRAM(s) Oral daily  buDESOnide 160 MICROgram(s)/formoterol 4.5 MICROgram(s) Inhaler 2 Puff(s) Inhalation two times a day  cholecalciferol 1000 Unit(s) Oral daily  dextrose 50% Injectable 50 milliLiter(s) IV Push every 15 minutes  dextrose 50% Injectable 25 milliLiter(s) IV Push every 15 minutes  docusate sodium 100 milliGRAM(s) Oral three times a day  enoxaparin Injectable 40 milliGRAM(s) SubCutaneous daily  insulin lispro (HumaLOG) corrective regimen sliding scale   SubCutaneous three times a day before meals  insulin lispro (HumaLOG) corrective regimen sliding scale   SubCutaneous at bedtime  metoprolol     tartrate 25 milliGRAM(s) Oral two times a day  montelukast 10 milliGRAM(s) Oral daily  pantoprazole    Tablet 40 milliGRAM(s) Oral before breakfast  sodium chloride 0.45%. 1000 milliLiter(s) (10 mL/Hr) IV Continuous <Continuous>    MEDICATIONS  (PRN):  naloxone Injectable 0.1 milliGRAM(s) IV Push every 3 minutes PRN For ANY of the following changes in patient status:  A. RR LESS THAN 10 breaths per minute, B. Oxygen saturation LESS THAN 90%, C. Sedation score of 6  ondansetron Injectable 4 milliGRAM(s) IV Push every 6 hours PRN Nausea  oxyCODONE    5 mG/acetaminophen 325 mG 2 Tablet(s) Oral every 4 hours PRN Severe Pain (7 - 10)  oxyCODONE    5 mG/acetaminophen 325 mG 1 Tablet(s) Oral every 4 hours PRN Moderate Pain (4 - 6)  oxyCODONE    5 mG/acetaminophen 325 mG 1 Tablet(s) Oral every 4 hours PRN Mild Pain (1 - 3)  oxyCODONE    5 mG/acetaminophen 325 mG 2 Tablet(s) Oral every 6 hours PRN Moderate Pain (4 - 6)      OBJECTIVE:    Sedation Score:	[x ] Alert	[ ] Drowsy 	[ ] Arousable	[ ] Asleep	[ ] Unresponsive    Side Effects:	[x ] None	[ ] Nausea	[ ] Vomiting	[ ] Pruritus  		[ ] Other:    Vital Signs Last 24 Hrs  T(C): 37.1 (10 Feb 2018 07:00), Max: 37.8 (09 Feb 2018 12:00)  T(F): 98.7 (10 Feb 2018 07:00), Max: 100 (09 Feb 2018 12:00)  HR: 88 (10 Feb 2018 07:00) (78 - 94)  BP: 141/90 (10 Feb 2018 07:00) (110/72 - 141/90)  BP(mean): 111 (10 Feb 2018 06:00) (86 - 111)  RR: 18 (10 Feb 2018 07:00) (16 - 28)  SpO2: 95% (10 Feb 2018 07:00) (90% - 97%)    ASSESSMENT/ PLAN    Therapy to  be:	[x ] Continue   [ ] Discontinued   [ ] Change to prn Analgesics    Documentation and Verification of current medications:   [X] Done	[ ] Not done, not eligible    Comments:

## 2018-02-11 LAB
ANION GAP SERPL CALC-SCNC: 15 MMOL/L — SIGNIFICANT CHANGE UP (ref 5–17)
BUN SERPL-MCNC: 28 MG/DL — HIGH (ref 7–23)
CALCIUM SERPL-MCNC: 9 MG/DL — SIGNIFICANT CHANGE UP (ref 8.4–10.5)
CHLORIDE SERPL-SCNC: 100 MMOL/L — SIGNIFICANT CHANGE UP (ref 96–108)
CO2 SERPL-SCNC: 24 MMOL/L — SIGNIFICANT CHANGE UP (ref 22–31)
CREAT SERPL-MCNC: 0.98 MG/DL — SIGNIFICANT CHANGE UP (ref 0.5–1.3)
GLUCOSE SERPL-MCNC: 119 MG/DL — HIGH (ref 70–99)
HCT VFR BLD CALC: 41.4 % — SIGNIFICANT CHANGE UP (ref 39–50)
HGB BLD-MCNC: 14 G/DL — SIGNIFICANT CHANGE UP (ref 13–17)
MCHC RBC-ENTMCNC: 33.2 PG — SIGNIFICANT CHANGE UP (ref 27–34)
MCHC RBC-ENTMCNC: 33.9 GM/DL — SIGNIFICANT CHANGE UP (ref 32–36)
MCV RBC AUTO: 98 FL — SIGNIFICANT CHANGE UP (ref 80–100)
PLATELET # BLD AUTO: 121 K/UL — LOW (ref 150–400)
POTASSIUM SERPL-MCNC: 4.3 MMOL/L — SIGNIFICANT CHANGE UP (ref 3.5–5.3)
POTASSIUM SERPL-SCNC: 4.3 MMOL/L — SIGNIFICANT CHANGE UP (ref 3.5–5.3)
RBC # BLD: 4.23 M/UL — SIGNIFICANT CHANGE UP (ref 4.2–5.8)
RBC # FLD: 12.2 % — SIGNIFICANT CHANGE UP (ref 10.3–14.5)
SODIUM SERPL-SCNC: 139 MMOL/L — SIGNIFICANT CHANGE UP (ref 135–145)
WBC # BLD: 15 K/UL — HIGH (ref 3.8–10.5)
WBC # FLD AUTO: 15 K/UL — HIGH (ref 3.8–10.5)

## 2018-02-11 PROCEDURE — 71045 X-RAY EXAM CHEST 1 VIEW: CPT | Mod: 26

## 2018-02-11 PROCEDURE — 93010 ELECTROCARDIOGRAM REPORT: CPT

## 2018-02-11 RX ORDER — KETOROLAC TROMETHAMINE 30 MG/ML
30 SYRINGE (ML) INJECTION ONCE
Qty: 0 | Refills: 0 | Status: DISCONTINUED | OUTPATIENT
Start: 2018-02-11 | End: 2018-02-11

## 2018-02-11 RX ORDER — RIVAROXABAN 15 MG-20MG
20 KIT ORAL EVERY 24 HOURS
Qty: 0 | Refills: 0 | Status: DISCONTINUED | OUTPATIENT
Start: 2018-02-11 | End: 2018-02-13

## 2018-02-11 RX ADMIN — PANTOPRAZOLE SODIUM 40 MILLIGRAM(S): 20 TABLET, DELAYED RELEASE ORAL at 05:43

## 2018-02-11 RX ADMIN — Medication 30 MILLIGRAM(S): at 16:18

## 2018-02-11 RX ADMIN — OXYCODONE AND ACETAMINOPHEN 2 TABLET(S): 5; 325 TABLET ORAL at 06:28

## 2018-02-11 RX ADMIN — Medication 100 MILLIGRAM(S): at 13:27

## 2018-02-11 RX ADMIN — BUDESONIDE AND FORMOTEROL FUMARATE DIHYDRATE 2 PUFF(S): 160; 4.5 AEROSOL RESPIRATORY (INHALATION) at 17:14

## 2018-02-11 RX ADMIN — Medication 25 MILLIGRAM(S): at 17:14

## 2018-02-11 RX ADMIN — OXYCODONE AND ACETAMINOPHEN 2 TABLET(S): 5; 325 TABLET ORAL at 16:10

## 2018-02-11 RX ADMIN — MONTELUKAST 10 MILLIGRAM(S): 4 TABLET, CHEWABLE ORAL at 11:03

## 2018-02-11 RX ADMIN — RIVAROXABAN 20 MILLIGRAM(S): KIT at 17:16

## 2018-02-11 RX ADMIN — Medication 81 MILLIGRAM(S): at 11:04

## 2018-02-11 RX ADMIN — BUDESONIDE AND FORMOTEROL FUMARATE DIHYDRATE 2 PUFF(S): 160; 4.5 AEROSOL RESPIRATORY (INHALATION) at 05:43

## 2018-02-11 RX ADMIN — Medication 30 MILLIGRAM(S): at 16:40

## 2018-02-11 RX ADMIN — Medication 1000 UNIT(S): at 11:03

## 2018-02-11 RX ADMIN — OXYCODONE AND ACETAMINOPHEN 2 TABLET(S): 5; 325 TABLET ORAL at 05:43

## 2018-02-11 RX ADMIN — Medication 100 MILLIGRAM(S): at 21:11

## 2018-02-11 RX ADMIN — Medication 100 MILLIGRAM(S): at 05:43

## 2018-02-11 RX ADMIN — OXYCODONE AND ACETAMINOPHEN 2 TABLET(S): 5; 325 TABLET ORAL at 15:37

## 2018-02-11 RX ADMIN — Medication 25 MILLIGRAM(S): at 05:43

## 2018-02-11 NOTE — PROGRESS NOTE ADULT - SUBJECTIVE AND OBJECTIVE BOX
VITAL SIGNS-Tele:  70-90-back into afib     Vital Signs Last 24 Hrs  T(C): 36.9 (18 @ 05:13), Max: 36.9 (02-10-18 @ 20:06)  HR: 47 (18 @ 09:17) (45 - 80)  BP: 106/74 (18 @ 06:31) (106/74 - 157/81)  SpO2: 93% (18 @ 10:54) (91% - 97%)         02-10 @ 07:01  -   @ 07:00  --------------------------------------------------------  IN: 870 mL / OUT: 620 mL / NET: 250 mL     @ 07:01  -   @ 12:23  --------------------------------------------------------  IN: 100 mL / OUT: 250 mL / NET: -150 mL    Daily     Daily Weight in k.6 (2018 08:06)    CAPILLARY BLOOD GLUCOSE  POCT Blood Glucose.: 108 mg/dL (2018 11:43)  POCT Blood Glucose.: 112 mg/dL (2018 06:25)  POCT Blood Glucose.: 115 mg/dL (10 Feb 2018 21:17)  POCT Blood Glucose.: 110 mg/dL (10 Feb 2018 16:21)        Pacing Wires        [  ]   Settings:                                  Isolated  [x  ]      PHYSICAL EXAM:  Neurology: alert and oriented x 3, nonfocal, no gross deficits  CV : S1S2  Sternal Wound minimal invasivve :  CDI , Stable  Lungs: CTA  Abdomen: soft, nontender, nondistended, positive bowel sounds, last bowel movement    pre-op     Extremities:  no calf tenderness, no edema       aspirin enteric coated 81 milliGRAM(s) Oral daily  buDESOnide 160 MICROgram(s)/formoterol 4.5 MICROgram(s) Inhaler 2 Puff(s) Inhalation two times a day  cholecalciferol 1000 Unit(s) Oral daily  dextrose 50% Injectable 50 milliLiter(s) IV Push every 15 minutes  dextrose 50% Injectable 25 milliLiter(s) IV Push every 15 minutes  docusate sodium 100 milliGRAM(s) Oral three times a day  insulin lispro (HumaLOG) corrective regimen sliding scale   SubCutaneous three times a day before meals  insulin lispro (HumaLOG) corrective regimen sliding scale   SubCutaneous at bedtime  metoprolol     tartrate 25 milliGRAM(s) Oral two times a day  montelukast 10 milliGRAM(s) Oral daily  naloxone Injectable 0.1 milliGRAM(s) IV Push every 3 minutes PRN  ondansetron Injectable 4 milliGRAM(s) IV Push every 6 hours PRN  oxyCODONE    5 mG/acetaminophen 325 mG 2 Tablet(s) Oral every 4 hours PRN  oxyCODONE    5 mG/acetaminophen 325 mG 1 Tablet(s) Oral every 4 hours PRN  pantoprazole    Tablet 40 milliGRAM(s) Oral before breakfast  rivaroxaban 20 milliGRAM(s) Oral every 24 hours  sodium chloride 0.45%. 1000 milliLiter(s) IV Continuous <Continuous>      Physical Therapy Rec:   Home  [  ]   Home w/ PT  [  ]  Rehab  [  ]  Discussed with Cardiothoracic Team at AM rounds.

## 2018-02-11 NOTE — PROGRESS NOTE ADULT - PROBLEM SELECTOR PLAN 1
Transferred to 2C step down  +pacing wires  to external pacer: isolate and insulate  Keep K>4.0  Mg>2.0  progressive ambulation  pulmonary toileting/incentive spirometry  pain management: with PCA and toradol  glucose control

## 2018-02-11 NOTE — PROGRESS NOTE ADULT - ASSESSMENT
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    Postop course:  2/8: epi and dobutamine weaned off   2/9: Transferred to step down. PCA resumed for pain management; maintain all CT as per Dr. Ascencio; cpap qhs for BARRON   2/11- VSS. Right CT removed.  will ck CXR. pt. tolerated well. Pt. converted back into Afib  - pw's d/c'd as pt.'s xarelto will be resumed tonight for his chronic afib  d/c planning home monday/probably tuesday.

## 2018-02-12 ENCOUNTER — TRANSCRIPTION ENCOUNTER (OUTPATIENT)
Age: 68
End: 2018-02-12

## 2018-02-12 LAB
ANION GAP SERPL CALC-SCNC: 11 MMOL/L — SIGNIFICANT CHANGE UP (ref 5–17)
BUN SERPL-MCNC: 26 MG/DL — HIGH (ref 7–23)
CALCIUM SERPL-MCNC: 8.8 MG/DL — SIGNIFICANT CHANGE UP (ref 8.4–10.5)
CHLORIDE SERPL-SCNC: 102 MMOL/L — SIGNIFICANT CHANGE UP (ref 96–108)
CO2 SERPL-SCNC: 26 MMOL/L — SIGNIFICANT CHANGE UP (ref 22–31)
CREAT SERPL-MCNC: 1 MG/DL — SIGNIFICANT CHANGE UP (ref 0.5–1.3)
GLUCOSE SERPL-MCNC: 114 MG/DL — HIGH (ref 70–99)
HCT VFR BLD CALC: 39.3 % — SIGNIFICANT CHANGE UP (ref 39–50)
HGB BLD-MCNC: 13.3 G/DL — SIGNIFICANT CHANGE UP (ref 13–17)
MCHC RBC-ENTMCNC: 33.1 PG — SIGNIFICANT CHANGE UP (ref 27–34)
MCHC RBC-ENTMCNC: 34 GM/DL — SIGNIFICANT CHANGE UP (ref 32–36)
MCV RBC AUTO: 97.3 FL — SIGNIFICANT CHANGE UP (ref 80–100)
PLATELET # BLD AUTO: 144 K/UL — LOW (ref 150–400)
POTASSIUM SERPL-MCNC: 4.2 MMOL/L — SIGNIFICANT CHANGE UP (ref 3.5–5.3)
POTASSIUM SERPL-SCNC: 4.2 MMOL/L — SIGNIFICANT CHANGE UP (ref 3.5–5.3)
RBC # BLD: 4.03 M/UL — LOW (ref 4.2–5.8)
RBC # FLD: 12.1 % — SIGNIFICANT CHANGE UP (ref 10.3–14.5)
SODIUM SERPL-SCNC: 139 MMOL/L — SIGNIFICANT CHANGE UP (ref 135–145)
WBC # BLD: 10.5 K/UL — SIGNIFICANT CHANGE UP (ref 3.8–10.5)
WBC # FLD AUTO: 10.5 K/UL — SIGNIFICANT CHANGE UP (ref 3.8–10.5)

## 2018-02-12 RX ORDER — POLYETHYLENE GLYCOL 3350 17 G/17G
17 POWDER, FOR SOLUTION ORAL DAILY
Qty: 0 | Refills: 0 | Status: DISCONTINUED | OUTPATIENT
Start: 2018-02-12 | End: 2018-02-13

## 2018-02-12 RX ORDER — METOPROLOL TARTRATE 50 MG
50 TABLET ORAL
Qty: 0 | Refills: 0 | Status: DISCONTINUED | OUTPATIENT
Start: 2018-02-12 | End: 2018-02-13

## 2018-02-12 RX ORDER — FUROSEMIDE 40 MG
40 TABLET ORAL DAILY
Qty: 0 | Refills: 0 | Status: DISCONTINUED | OUTPATIENT
Start: 2018-02-12 | End: 2018-02-13

## 2018-02-12 RX ORDER — METOPROLOL TARTRATE 50 MG
25 TABLET ORAL ONCE
Qty: 0 | Refills: 0 | Status: COMPLETED | OUTPATIENT
Start: 2018-02-12 | End: 2018-02-12

## 2018-02-12 RX ORDER — PHENYLEPHRINE-SHARK LIVER OIL-MINERAL OIL-PETROLATUM RECTAL OINTMENT
1 OINTMENT (GRAM) RECTAL AT BEDTIME
Qty: 0 | Refills: 0 | Status: DISCONTINUED | OUTPATIENT
Start: 2018-02-12 | End: 2018-02-13

## 2018-02-12 RX ORDER — POTASSIUM CHLORIDE 20 MEQ
20 PACKET (EA) ORAL DAILY
Qty: 0 | Refills: 0 | Status: DISCONTINUED | OUTPATIENT
Start: 2018-02-12 | End: 2018-02-13

## 2018-02-12 RX ORDER — PHENYLEPHRINE-SHARK LIVER OIL-MINERAL OIL-PETROLATUM RECTAL OINTMENT
1 OINTMENT (GRAM) RECTAL THREE TIMES A DAY
Qty: 0 | Refills: 0 | Status: DISCONTINUED | OUTPATIENT
Start: 2018-02-12 | End: 2018-02-13

## 2018-02-12 RX ADMIN — OXYCODONE AND ACETAMINOPHEN 1 TABLET(S): 5; 325 TABLET ORAL at 13:33

## 2018-02-12 RX ADMIN — BUDESONIDE AND FORMOTEROL FUMARATE DIHYDRATE 2 PUFF(S): 160; 4.5 AEROSOL RESPIRATORY (INHALATION) at 17:10

## 2018-02-12 RX ADMIN — Medication 25 MILLIGRAM(S): at 13:05

## 2018-02-12 RX ADMIN — Medication 1000 UNIT(S): at 11:14

## 2018-02-12 RX ADMIN — Medication 100 MILLIGRAM(S): at 06:24

## 2018-02-12 RX ADMIN — Medication 20 MILLIEQUIVALENT(S): at 17:10

## 2018-02-12 RX ADMIN — Medication 81 MILLIGRAM(S): at 11:14

## 2018-02-12 RX ADMIN — PANTOPRAZOLE SODIUM 40 MILLIGRAM(S): 20 TABLET, DELAYED RELEASE ORAL at 07:46

## 2018-02-12 RX ADMIN — Medication 100 MILLIGRAM(S): at 21:40

## 2018-02-12 RX ADMIN — RIVAROXABAN 20 MILLIGRAM(S): KIT at 17:10

## 2018-02-12 RX ADMIN — Medication 50 MILLIGRAM(S): at 17:10

## 2018-02-12 RX ADMIN — BUDESONIDE AND FORMOTEROL FUMARATE DIHYDRATE 2 PUFF(S): 160; 4.5 AEROSOL RESPIRATORY (INHALATION) at 06:24

## 2018-02-12 RX ADMIN — MONTELUKAST 10 MILLIGRAM(S): 4 TABLET, CHEWABLE ORAL at 11:14

## 2018-02-12 RX ADMIN — Medication 25 MILLIGRAM(S): at 06:24

## 2018-02-12 RX ADMIN — OXYCODONE AND ACETAMINOPHEN 1 TABLET(S): 5; 325 TABLET ORAL at 14:00

## 2018-02-12 RX ADMIN — Medication 100 MILLIGRAM(S): at 13:05

## 2018-02-12 RX ADMIN — Medication 40 MILLIGRAM(S): at 17:10

## 2018-02-12 NOTE — PROGRESS NOTE ADULT - ASSESSMENT
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    Postop course:  2/8: epi and dobutamine weaned off   2/9: Transferred to step down. PCA resumed for pain management; maintain all CT as per Dr. Ascencio; cpap qhs for BARRON   2/11- VSS. Right CT removed.  will ck CXR. pt. tolerated well. Pt. converted back into Afib  - pw's d/c'd as pt.'s xarelto will be resumed tonight for his chronic afib  2/12 continue Xarelto-pt. ambulating in hallway- showered. no complaints-afib up to 160 - BB increased to 50mg po bid  d/c planning home tuesday.

## 2018-02-12 NOTE — PROGRESS NOTE ADULT - SUBJECTIVE AND OBJECTIVE BOX
VITAL SIGNS-Tele: afib   up to 160 brief  Vital Signs Last 24 Hrs  T(C): 36.5 (18 @ 04:49), Max: 36.8 (18 @ 13:50)  HR: 80 (18 @ 06:38) (49 - 100)  BP: 114/66 (18 @ 04:49) (114/66 - 141/91)  SpO2: 96% (18 @ 06:38) (93% - 100%)          07:01  -   @ 07:00  --------------------------------------------------------  IN: 720 mL / OUT: 1075 mL / NET: -355 mL     @ 07:01  -   @ 12:23  --------------------------------------------------------  IN: 240 mL / OUT: 0 mL / NET: 240 mL    Daily     Daily Weight in k.1 (2018 07:52)    CAPILLARY BLOOD GLUCOSE  POCT Blood Glucose.: 107 mg/dL (2018 11:36)  POCT Blood Glucose.: 100 mg/dL (2018 07:33)  POCT Blood Glucose.: 109 mg/dL (2018 21:20)  POCT Blood Glucose.: 111 mg/dL (2018 16:36)        PHYSICAL EXAM:  Neurology: alert and oriented x 3, nonfocal, no gross deficits  CV : S1S1  Sternal Wound :  CDI , Stable  Lungs: CTA - diminished BS  Abdomen: soft, nontender, nondistended, positive bowel sounds, last bowel movement         Extremities:     no edema, no calf tenderness    aspirin enteric coated 81 milliGRAM(s) Oral daily  buDESOnide 160 MICROgram(s)/formoterol 4.5 MICROgram(s) Inhaler 2 Puff(s) Inhalation two times a day  cholecalciferol 1000 Unit(s) Oral daily  docusate sodium 100 milliGRAM(s) Oral three times a day  metoprolol     tartrate 25 milliGRAM(s) Oral two times a day  montelukast 10 milliGRAM(s) Oral daily  naloxone Injectable 0.1 milliGRAM(s) IV Push every 3 minutes PRN  ondansetron Injectable 4 milliGRAM(s) IV Push every 6 hours PRN  oxyCODONE    5 mG/acetaminophen 325 mG 2 Tablet(s) Oral every 4 hours PRN  oxyCODONE    5 mG/acetaminophen 325 mG 1 Tablet(s) Oral every 4 hours PRN  pantoprazole    Tablet 40 milliGRAM(s) Oral before breakfast  rivaroxaban 20 milliGRAM(s) Oral every 24 hours  sodium chloride 0.45%. 1000 milliLiter(s) IV Continuous <Continuous>    Physical Therapy Rec:   Home  [  ]   Home w/ PT  [x ]  Rehab  [  ]  Discussed with Cardiothoracic Team at AM rounds.

## 2018-02-12 NOTE — DISCHARGE NOTE ADULT - MEDICATION SUMMARY - MEDICATIONS TO TAKE
I will START or STAY ON the medications listed below when I get home from the hospital:    oxyCODONE-acetaminophen 5 mg-325 mg oral tablet  -- 1 tab(s) by mouth every 4 hours, As Needed -Moderate Pain (4 - 6) MDD:6  -- Indication: For Pain med    aspirin 81 mg oral delayed release tablet  -- 1 tab(s) by mouth once a day  -- Indication: For anti-platelet    Cartia  mg/24 hours oral capsule, extended release  -- 1 cap(s) by mouth once a day  -- Indication: For heart med    rivaroxaban 20 mg oral tablet  -- 1 tab(s) by mouth every 24 hours  -- Indication: For blood thinner    Gralise 300 mg/24 hours oral tablet, extended release  -- 2 tab(s) by mouth once a day, As Needed - for severe pain, back  -- Indication: For neuropathy    Advair Diskus 250 mcg-50 mcg inhalation powder  -- 1 puff(s) inhaled 2 times a day  -- Indication: For lungs    furosemide 40 mg oral tablet  -- 1 tab(s) by mouth once a day  -- Indication: For water pill    polyethylene glycol 3350 oral powder for reconstitution  -- 17 gram(s) by mouth once a day  -- Indication: For laxative    docusate sodium 100 mg oral capsule  -- 1 cap(s) by mouth 3 times a day  -- Indication: For Stool softener    montelukast 10 mg oral tablet  -- 1 tab(s) by mouth once a day  -- Indication: For lungs    potassium chloride 20 mEq oral tablet, extended release  -- 1 tab(s) by mouth once a day  -- Indication: For Potassium chloride    Vitamin D3 5000 intl units oral capsule  -- 1 cap(s) by mouth once a day  -- Indication: For vitamin

## 2018-02-12 NOTE — DISCHARGE NOTE ADULT - OTHER SIGNIFICANT FINDINGS
VSS - TEle afib   Cor: S1S2 irregular  lungs CTA  MT minimally invasive incision CDI  abd: soft nt/non distended + BS + BM  ext no edema, no calf tenderness

## 2018-02-12 NOTE — PROGRESS NOTE ADULT - PROBLEM SELECTOR PLAN 3
Currently in SR  2/11 converted back into afib - pw's d/c'd as xarelto will be resumed tonight as per dr. agustin.  d/c home monday, tuesday
Currently in SR
Currently in SR  2/11 converted back into afib - pw's d/c'd as xarelto will be resumed tonight as per dr. agustin.  2/12 - increase lopressor too 50mg po bid this am as pt. had bursts of afib last evening up to 160  d/c home monday, tuesday
cpap qhs
SR 70-80.  ( PAC's )  PAT's overnight  BB increased  Start Xarelto in am

## 2018-02-12 NOTE — PROGRESS NOTE ADULT - PROBLEM SELECTOR PLAN 2
right ct removed will ck CXR
Chest tube to LWS  follow daily CXR
right ct removed will ck CXR
dvt prophylaxis  ppi
C/w GI/DVT prophylaxis  C/w pain control  C/w glucose control  C/w OOB, incentive spirometry, ambulation as tolerated.
Chest tube to LWS  follow daily CXR

## 2018-02-12 NOTE — DISCHARGE NOTE ADULT - CARE PROVIDERS DIRECT ADDRESSES
[email or web address]

## 2018-02-12 NOTE — DISCHARGE NOTE ADULT - PLAN OF CARE
full recovery follow up appt with Shikha De Souza NP on WEdnesday, February 28 @ 1:45pm  follow up appt with your cardiologist in 2-3 weeks  ambulate 4-5 times a day

## 2018-02-12 NOTE — DISCHARGE NOTE ADULT - MEDICATION SUMMARY - MEDICATIONS TO STOP TAKING
I will STOP taking the medications listed below when I get home from the hospital:    lisinopril 20 mg oral tablet  -- 1 tab(s) by mouth 2 times a day    traMADol 50 mg oral tablet  -- 1 tab(s) by mouth every 4 hours, As Needed    ProAir HFA 90 mcg/inh inhalation aerosol  -- 2 puff(s) inhaled 4 times a day, As Needed - for bronchospasm, last use, '' a while ago''

## 2018-02-12 NOTE — PROGRESS NOTE ADULT - PROBLEM SELECTOR PROBLEM 3
Chronic atrial fibrillation
BARRON (obstructive sleep apnea)
Chronic atrial fibrillation

## 2018-02-12 NOTE — DISCHARGE NOTE ADULT - NS AS ACTIVITY OBS
Sex allowed/Showering allowed/Driving allowed/Walking-Indoors allowed/Walking-Outdoors allowed/Stairs allowed

## 2018-02-12 NOTE — PROGRESS NOTE ADULT - PROBLEM SELECTOR PROBLEM 4
BARRON (obstructive sleep apnea)
Chronic obstructive pulmonary disease, unspecified COPD type
BARRON (obstructive sleep apnea)

## 2018-02-12 NOTE — DISCHARGE NOTE ADULT - CARE PROVIDER_API CALL
Dimitri Ascencio), Surgery; Thoracic and Cardiac Surgery  [address]  [address] [place] [address]  Phone: [phone]  Fax: [phone]    Real Cespedes), Cardiovascular Disease  [address] [place] [address]  Phone: [phone]  Fax: [phone]

## 2018-02-12 NOTE — DISCHARGE NOTE ADULT - PATIENT PORTAL LINK FT
You can access the [facility], offered by [facility], by registering with the following website: [email or web address]

## 2018-02-12 NOTE — DISCHARGE NOTE ADULT - CARE PLAN
Principal Discharge DX:	S/P ascending aortic aneurysm repair  Goal:	full recovery  Assessment and plan of treatment:	follow up appt with Shikha De Souza NP on WEdnesday, February 28 @ 1:45pm  follow up appt with your cardiologist in 2-3 weeks  ambulate 4-5 times a day

## 2018-02-12 NOTE — PROGRESS NOTE ADULT - PROBLEM SELECTOR PROBLEM 2
Chest tube in place
Prophylactic measure
Prophylactic measure
Chest tube in place

## 2018-02-12 NOTE — DISCHARGE NOTE ADULT - HOSPITAL COURSE
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    Postop course:  2/8: epi and dobutamine weaned off   2/9: Transferred to step down. PCA resumed for pain management; maintain all CT as per Dr. Ascencio; cpap qhs for BARRON   2/11- VSS. Right CT removed.  will ck CXR. pt. tolerated well. Pt. converted back into Afib  - pw's d/c'd as pt.'s xarelto will be resumed tonight for his chronic afib  d/c planning home Tuesday

## 2018-02-13 VITALS — OXYGEN SATURATION: 96 % | HEART RATE: 101 BPM

## 2018-02-13 LAB
ANION GAP SERPL CALC-SCNC: 16 MMOL/L — SIGNIFICANT CHANGE UP (ref 5–17)
BLD GP AB SCN SERPL QL: NEGATIVE — SIGNIFICANT CHANGE UP
BUN SERPL-MCNC: 18 MG/DL — SIGNIFICANT CHANGE UP (ref 7–23)
CALCIUM SERPL-MCNC: 8.8 MG/DL — SIGNIFICANT CHANGE UP (ref 8.4–10.5)
CHLORIDE SERPL-SCNC: 102 MMOL/L — SIGNIFICANT CHANGE UP (ref 96–108)
CO2 SERPL-SCNC: 22 MMOL/L — SIGNIFICANT CHANGE UP (ref 22–31)
CREAT SERPL-MCNC: 0.86 MG/DL — SIGNIFICANT CHANGE UP (ref 0.5–1.3)
GLUCOSE SERPL-MCNC: 113 MG/DL — HIGH (ref 70–99)
HCT VFR BLD CALC: 40.4 % — SIGNIFICANT CHANGE UP (ref 39–50)
HGB BLD-MCNC: 13.6 G/DL — SIGNIFICANT CHANGE UP (ref 13–17)
MCHC RBC-ENTMCNC: 32.7 PG — SIGNIFICANT CHANGE UP (ref 27–34)
MCHC RBC-ENTMCNC: 33.8 GM/DL — SIGNIFICANT CHANGE UP (ref 32–36)
MCV RBC AUTO: 96.8 FL — SIGNIFICANT CHANGE UP (ref 80–100)
PLATELET # BLD AUTO: 185 K/UL — SIGNIFICANT CHANGE UP (ref 150–400)
POTASSIUM SERPL-MCNC: 3.9 MMOL/L — SIGNIFICANT CHANGE UP (ref 3.5–5.3)
POTASSIUM SERPL-SCNC: 3.9 MMOL/L — SIGNIFICANT CHANGE UP (ref 3.5–5.3)
RBC # BLD: 4.17 M/UL — LOW (ref 4.2–5.8)
RBC # FLD: 12.1 % — SIGNIFICANT CHANGE UP (ref 10.3–14.5)
RH IG SCN BLD-IMP: POSITIVE — SIGNIFICANT CHANGE UP
SODIUM SERPL-SCNC: 140 MMOL/L — SIGNIFICANT CHANGE UP (ref 135–145)
WBC # BLD: 10.5 K/UL — SIGNIFICANT CHANGE UP (ref 3.8–10.5)
WBC # FLD AUTO: 10.5 K/UL — SIGNIFICANT CHANGE UP (ref 3.8–10.5)

## 2018-02-13 PROCEDURE — 94660 CPAP INITIATION&MGMT: CPT

## 2018-02-13 PROCEDURE — 82435 ASSAY OF BLOOD CHLORIDE: CPT

## 2018-02-13 PROCEDURE — 80048 BASIC METABOLIC PNL TOTAL CA: CPT

## 2018-02-13 PROCEDURE — 86891 AUTOLOGOUS BLOOD OP SALVAGE: CPT

## 2018-02-13 PROCEDURE — 94640 AIRWAY INHALATION TREATMENT: CPT

## 2018-02-13 PROCEDURE — 93005 ELECTROCARDIOGRAM TRACING: CPT

## 2018-02-13 PROCEDURE — 86900 BLOOD TYPING SEROLOGIC ABO: CPT

## 2018-02-13 PROCEDURE — 97530 THERAPEUTIC ACTIVITIES: CPT

## 2018-02-13 PROCEDURE — C1751: CPT

## 2018-02-13 PROCEDURE — C1729: CPT

## 2018-02-13 PROCEDURE — 86850 RBC ANTIBODY SCREEN: CPT

## 2018-02-13 PROCEDURE — 82947 ASSAY GLUCOSE BLOOD QUANT: CPT

## 2018-02-13 PROCEDURE — 84132 ASSAY OF SERUM POTASSIUM: CPT

## 2018-02-13 PROCEDURE — 82553 CREATINE MB FRACTION: CPT

## 2018-02-13 PROCEDURE — 80053 COMPREHEN METABOLIC PANEL: CPT

## 2018-02-13 PROCEDURE — 85730 THROMBOPLASTIN TIME PARTIAL: CPT

## 2018-02-13 PROCEDURE — C1887: CPT

## 2018-02-13 PROCEDURE — 88304 TISSUE EXAM BY PATHOLOGIST: CPT

## 2018-02-13 PROCEDURE — 85610 PROTHROMBIN TIME: CPT

## 2018-02-13 PROCEDURE — 82962 GLUCOSE BLOOD TEST: CPT

## 2018-02-13 PROCEDURE — 82330 ASSAY OF CALCIUM: CPT

## 2018-02-13 PROCEDURE — 86923 COMPATIBILITY TEST ELECTRIC: CPT

## 2018-02-13 PROCEDURE — C1889: CPT

## 2018-02-13 PROCEDURE — 99238 HOSP IP/OBS DSCHRG MGMT 30/<: CPT

## 2018-02-13 PROCEDURE — 84484 ASSAY OF TROPONIN QUANT: CPT

## 2018-02-13 PROCEDURE — P9047: CPT

## 2018-02-13 PROCEDURE — 97116 GAIT TRAINING THERAPY: CPT

## 2018-02-13 PROCEDURE — 83605 ASSAY OF LACTIC ACID: CPT

## 2018-02-13 PROCEDURE — P9045: CPT

## 2018-02-13 PROCEDURE — 84295 ASSAY OF SERUM SODIUM: CPT

## 2018-02-13 PROCEDURE — 97161 PT EVAL LOW COMPLEX 20 MIN: CPT

## 2018-02-13 PROCEDURE — 85027 COMPLETE CBC AUTOMATED: CPT

## 2018-02-13 PROCEDURE — 82550 ASSAY OF CK (CPK): CPT

## 2018-02-13 PROCEDURE — 94003 VENT MGMT INPAT SUBQ DAY: CPT

## 2018-02-13 PROCEDURE — 85384 FIBRINOGEN ACTIVITY: CPT

## 2018-02-13 PROCEDURE — 71045 X-RAY EXAM CHEST 1 VIEW: CPT

## 2018-02-13 PROCEDURE — C1769: CPT

## 2018-02-13 PROCEDURE — P9016: CPT

## 2018-02-13 PROCEDURE — 86901 BLOOD TYPING SEROLOGIC RH(D): CPT

## 2018-02-13 PROCEDURE — 82803 BLOOD GASES ANY COMBINATION: CPT

## 2018-02-13 PROCEDURE — C1768: CPT

## 2018-02-13 PROCEDURE — 85014 HEMATOCRIT: CPT

## 2018-02-13 RX ORDER — FUROSEMIDE 40 MG
1 TABLET ORAL
Qty: 5 | Refills: 0
Start: 2018-02-13 | End: 2018-02-17

## 2018-02-13 RX ORDER — POLYETHYLENE GLYCOL 3350 17 G/17G
17 POWDER, FOR SOLUTION ORAL
Qty: 1 | Refills: 0
Start: 2018-02-13 | End: 2018-03-14

## 2018-02-13 RX ORDER — RIVAROXABAN 15 MG-20MG
1 KIT ORAL
Qty: 30 | Refills: 0
Start: 2018-02-13 | End: 2018-03-14

## 2018-02-13 RX ORDER — DILTIAZEM HCL 120 MG
1 CAPSULE, EXT RELEASE 24 HR ORAL
Qty: 30 | Refills: 0
Start: 2018-02-13 | End: 2018-03-14

## 2018-02-13 RX ORDER — DILTIAZEM HCL 120 MG
1 CAPSULE, EXT RELEASE 24 HR ORAL
Qty: 0 | Refills: 0 | COMMUNITY

## 2018-02-13 RX ORDER — DILTIAZEM HCL 120 MG
240 CAPSULE, EXT RELEASE 24 HR ORAL DAILY
Qty: 0 | Refills: 0 | Status: DISCONTINUED | OUTPATIENT
Start: 2018-02-13 | End: 2018-02-13

## 2018-02-13 RX ORDER — ALBUTEROL 90 UG/1
2 AEROSOL, METERED ORAL
Qty: 0 | Refills: 0 | COMMUNITY

## 2018-02-13 RX ORDER — MONTELUKAST 4 MG/1
1 TABLET, CHEWABLE ORAL
Qty: 3 | Refills: 0
Start: 2018-02-13 | End: 2018-02-15

## 2018-02-13 RX ORDER — LISINOPRIL 2.5 MG/1
1 TABLET ORAL
Qty: 0 | Refills: 0 | COMMUNITY

## 2018-02-13 RX ORDER — RIVAROXABAN 15 MG-20MG
1 KIT ORAL
Qty: 0 | Refills: 0 | COMMUNITY

## 2018-02-13 RX ORDER — TRAMADOL HYDROCHLORIDE 50 MG/1
1 TABLET ORAL
Qty: 0 | Refills: 0 | COMMUNITY

## 2018-02-13 RX ORDER — MONTELUKAST 4 MG/1
1 TABLET, CHEWABLE ORAL
Qty: 0 | Refills: 0 | COMMUNITY

## 2018-02-13 RX ORDER — DOCUSATE SODIUM 100 MG
1 CAPSULE ORAL
Qty: 90 | Refills: 0
Start: 2018-02-13 | End: 2018-03-14

## 2018-02-13 RX ORDER — FLUTICASONE PROPIONATE AND SALMETEROL 50; 250 UG/1; UG/1
1 POWDER ORAL; RESPIRATORY (INHALATION)
Qty: 1 | Refills: 0
Start: 2018-02-13 | End: 2018-03-14

## 2018-02-13 RX ORDER — ASPIRIN/CALCIUM CARB/MAGNESIUM 324 MG
1 TABLET ORAL
Qty: 30 | Refills: 0
Start: 2018-02-13 | End: 2018-03-14

## 2018-02-13 RX ORDER — POTASSIUM CHLORIDE 20 MEQ
1 PACKET (EA) ORAL
Qty: 5 | Refills: 0
Start: 2018-02-13 | End: 2018-02-17

## 2018-02-13 RX ORDER — FLUTICASONE PROPIONATE AND SALMETEROL 50; 250 UG/1; UG/1
1 POWDER ORAL; RESPIRATORY (INHALATION)
Qty: 0 | Refills: 0 | COMMUNITY

## 2018-02-13 RX ADMIN — OXYCODONE AND ACETAMINOPHEN 1 TABLET(S): 5; 325 TABLET ORAL at 05:05

## 2018-02-13 RX ADMIN — OXYCODONE AND ACETAMINOPHEN 1 TABLET(S): 5; 325 TABLET ORAL at 05:45

## 2018-02-13 RX ADMIN — Medication 100 MILLIGRAM(S): at 13:09

## 2018-02-13 RX ADMIN — Medication 20 MILLIEQUIVALENT(S): at 11:23

## 2018-02-13 RX ADMIN — Medication 1000 UNIT(S): at 11:23

## 2018-02-13 RX ADMIN — Medication 81 MILLIGRAM(S): at 11:22

## 2018-02-13 RX ADMIN — Medication 100 MILLIGRAM(S): at 05:02

## 2018-02-13 RX ADMIN — PHENYLEPHRINE-SHARK LIVER OIL-MINERAL OIL-PETROLATUM RECTAL OINTMENT 1 APPLICATION(S): at 05:01

## 2018-02-13 RX ADMIN — Medication 240 MILLIGRAM(S): at 07:38

## 2018-02-13 RX ADMIN — Medication 50 MILLIGRAM(S): at 05:03

## 2018-02-13 RX ADMIN — MONTELUKAST 10 MILLIGRAM(S): 4 TABLET, CHEWABLE ORAL at 11:23

## 2018-02-13 RX ADMIN — BUDESONIDE AND FORMOTEROL FUMARATE DIHYDRATE 2 PUFF(S): 160; 4.5 AEROSOL RESPIRATORY (INHALATION) at 05:00

## 2018-02-13 RX ADMIN — Medication 40 MILLIGRAM(S): at 05:02

## 2018-02-13 RX ADMIN — OXYCODONE AND ACETAMINOPHEN 1 TABLET(S): 5; 325 TABLET ORAL at 13:09

## 2018-02-13 RX ADMIN — POLYETHYLENE GLYCOL 3350 17 GRAM(S): 17 POWDER, FOR SOLUTION ORAL at 11:23

## 2018-02-13 RX ADMIN — PANTOPRAZOLE SODIUM 40 MILLIGRAM(S): 20 TABLET, DELAYED RELEASE ORAL at 05:04

## 2018-02-21 ENCOUNTER — TRANSCRIPTION ENCOUNTER (OUTPATIENT)
Age: 68
End: 2018-02-21

## 2018-02-28 ENCOUNTER — APPOINTMENT (OUTPATIENT)
Dept: CARDIOTHORACIC SURGERY | Facility: CLINIC | Age: 68
End: 2018-02-28

## 2018-02-28 VITALS
OXYGEN SATURATION: 98 % | BODY MASS INDEX: 32.64 KG/M2 | DIASTOLIC BLOOD PRESSURE: 72 MMHG | SYSTOLIC BLOOD PRESSURE: 118 MMHG | TEMPERATURE: 98.2 F | HEART RATE: 91 BPM | WEIGHT: 241 LBS | RESPIRATION RATE: 15 BRPM | HEIGHT: 72 IN

## 2018-02-28 RX ORDER — MUPIROCIN 20 MG/G
2 OINTMENT TOPICAL
Qty: 1 | Refills: 0 | Status: DISCONTINUED | COMMUNITY
Start: 2018-01-30 | End: 2018-02-28

## 2018-03-09 ENCOUNTER — APPOINTMENT (OUTPATIENT)
Dept: CARDIOTHORACIC SURGERY | Facility: CLINIC | Age: 68
End: 2018-03-09
Payer: MEDICARE

## 2018-03-09 VITALS
RESPIRATION RATE: 16 BRPM | SYSTOLIC BLOOD PRESSURE: 148 MMHG | TEMPERATURE: 97.4 F | BODY MASS INDEX: 32.51 KG/M2 | HEART RATE: 88 BPM | HEIGHT: 72 IN | DIASTOLIC BLOOD PRESSURE: 92 MMHG | WEIGHT: 240 LBS | OXYGEN SATURATION: 97 %

## 2018-03-09 PROCEDURE — 99024 POSTOP FOLLOW-UP VISIT: CPT

## 2018-04-02 ENCOUNTER — RX RENEWAL (OUTPATIENT)
Age: 68
End: 2018-04-02

## 2018-04-03 ENCOUNTER — MEDICATION RENEWAL (OUTPATIENT)
Age: 68
End: 2018-04-03

## 2018-04-11 ENCOUNTER — NON-APPOINTMENT (OUTPATIENT)
Age: 68
End: 2018-04-11

## 2018-04-11 ENCOUNTER — APPOINTMENT (OUTPATIENT)
Dept: CARDIOLOGY | Facility: CLINIC | Age: 68
End: 2018-04-11
Payer: MEDICARE

## 2018-04-11 VITALS
BODY MASS INDEX: 33.86 KG/M2 | DIASTOLIC BLOOD PRESSURE: 86 MMHG | HEART RATE: 84 BPM | OXYGEN SATURATION: 96 % | HEIGHT: 72 IN | WEIGHT: 250 LBS | SYSTOLIC BLOOD PRESSURE: 130 MMHG

## 2018-04-11 DIAGNOSIS — I51.89 OTHER ILL-DEFINED HEART DISEASES: ICD-10-CM

## 2018-04-11 DIAGNOSIS — G47.33 OBSTRUCTIVE SLEEP APNEA (ADULT) (PEDIATRIC): ICD-10-CM

## 2018-04-11 DIAGNOSIS — A49.01 METHICILLIN SUSCEPTIBLE STAPHYLOCOCCUS AUREUS INFECTION, UNSPECIFIED SITE: ICD-10-CM

## 2018-04-11 DIAGNOSIS — I51.7 CARDIOMEGALY: ICD-10-CM

## 2018-04-11 DIAGNOSIS — Z99.89 OBSTRUCTIVE SLEEP APNEA (ADULT) (PEDIATRIC): ICD-10-CM

## 2018-04-11 DIAGNOSIS — G89.18 OTHER ACUTE POSTPROCEDURAL PAIN: ICD-10-CM

## 2018-04-11 DIAGNOSIS — S31.109A UNSPECIFIED OPEN WOUND OF ABDOMINAL WALL, UNSPECIFIED QUADRANT W/OUT PENETRATION INTO PERITONEAL CAVITY, INITIAL ENCOUNTER: ICD-10-CM

## 2018-04-11 DIAGNOSIS — Z87.09 PERSONAL HISTORY OF OTHER DISEASES OF THE RESPIRATORY SYSTEM: ICD-10-CM

## 2018-04-11 PROCEDURE — 99214 OFFICE O/P EST MOD 30 MIN: CPT | Mod: 25

## 2018-04-11 PROCEDURE — 93000 ELECTROCARDIOGRAM COMPLETE: CPT

## 2018-04-30 ENCOUNTER — APPOINTMENT (OUTPATIENT)
Dept: SURGERY | Facility: CLINIC | Age: 68
End: 2018-04-30
Payer: MEDICARE

## 2018-04-30 VITALS — HEIGHT: 74 IN | BODY MASS INDEX: 30.8 KG/M2 | WEIGHT: 240 LBS

## 2018-04-30 PROCEDURE — 99214 OFFICE O/P EST MOD 30 MIN: CPT

## 2018-07-16 PROBLEM — I48.91 UNSPECIFIED ATRIAL FIBRILLATION: Chronic | Status: ACTIVE | Noted: 2017-12-29

## 2018-07-16 PROBLEM — G47.33 OBSTRUCTIVE SLEEP APNEA (ADULT) (PEDIATRIC): Chronic | Status: ACTIVE | Noted: 2017-12-29

## 2018-07-16 PROBLEM — I77.810 THORACIC AORTIC ECTASIA: Chronic | Status: ACTIVE | Noted: 2017-12-29

## 2018-07-27 PROBLEM — Z78.9 ALCOHOL USE: Status: ACTIVE | Noted: 2017-12-19

## 2018-11-01 ENCOUNTER — NON-APPOINTMENT (OUTPATIENT)
Age: 68
End: 2018-11-01

## 2018-11-01 ENCOUNTER — APPOINTMENT (OUTPATIENT)
Dept: CARDIOLOGY | Facility: CLINIC | Age: 68
End: 2018-11-01
Payer: MEDICARE

## 2018-11-01 VITALS — HEIGHT: 74 IN | WEIGHT: 257 LBS | BODY MASS INDEX: 32.98 KG/M2

## 2018-11-01 VITALS — HEART RATE: 75 BPM | DIASTOLIC BLOOD PRESSURE: 83 MMHG | SYSTOLIC BLOOD PRESSURE: 138 MMHG | OXYGEN SATURATION: 98 %

## 2018-11-01 DIAGNOSIS — K62.5 HEMORRHAGE OF ANUS AND RECTUM: ICD-10-CM

## 2018-11-01 PROBLEM — I34.0 NONRHEUMATIC MITRAL (VALVE) INSUFFICIENCY: Chronic | Status: ACTIVE | Noted: 2017-12-29

## 2018-11-01 PROBLEM — J43.9 EMPHYSEMA, UNSPECIFIED: Chronic | Status: ACTIVE | Noted: 2018-01-25

## 2018-11-01 PROBLEM — R94.39 ABNORMAL RESULT OF OTHER CARDIOVASCULAR FUNCTION STUDY: Chronic | Status: ACTIVE | Noted: 2017-12-29

## 2018-11-01 PROBLEM — I10 ESSENTIAL (PRIMARY) HYPERTENSION: Chronic | Status: ACTIVE | Noted: 2017-12-29

## 2018-11-01 PROBLEM — I51.7 CARDIOMEGALY: Chronic | Status: ACTIVE | Noted: 2017-12-29

## 2018-11-01 PROBLEM — I34.0 NONRHEUMATIC MITRAL (VALVE) INSUFFICIENCY: Chronic | Status: ACTIVE | Noted: 2018-01-25

## 2018-11-01 PROBLEM — I07.1 RHEUMATIC TRICUSPID INSUFFICIENCY: Chronic | Status: ACTIVE | Noted: 2018-01-25

## 2018-11-01 PROCEDURE — 93000 ELECTROCARDIOGRAM COMPLETE: CPT

## 2018-11-01 PROCEDURE — 99214 OFFICE O/P EST MOD 30 MIN: CPT | Mod: 25

## 2019-01-24 ENCOUNTER — RX RENEWAL (OUTPATIENT)
Age: 69
End: 2019-01-24

## 2019-01-25 ENCOUNTER — RX RENEWAL (OUTPATIENT)
Age: 69
End: 2019-01-25

## 2019-03-01 ENCOUNTER — APPOINTMENT (OUTPATIENT)
Dept: CARDIOLOGY | Facility: CLINIC | Age: 69
End: 2019-03-01
Payer: MEDICARE

## 2019-03-01 PROCEDURE — 93306 TTE W/DOPPLER COMPLETE: CPT

## 2019-03-05 ENCOUNTER — RESULT CHARGE (OUTPATIENT)
Age: 69
End: 2019-03-05

## 2019-03-06 ENCOUNTER — APPOINTMENT (OUTPATIENT)
Dept: CARDIOLOGY | Facility: CLINIC | Age: 69
End: 2019-03-06
Payer: MEDICARE

## 2019-03-06 ENCOUNTER — NON-APPOINTMENT (OUTPATIENT)
Age: 69
End: 2019-03-06

## 2019-03-06 VITALS
SYSTOLIC BLOOD PRESSURE: 138 MMHG | OXYGEN SATURATION: 98 % | HEART RATE: 69 BPM | DIASTOLIC BLOOD PRESSURE: 89 MMHG | BODY MASS INDEX: 33.11 KG/M2 | HEIGHT: 74 IN | WEIGHT: 258 LBS

## 2019-03-06 PROCEDURE — 93000 ELECTROCARDIOGRAM COMPLETE: CPT

## 2019-03-06 PROCEDURE — 99214 OFFICE O/P EST MOD 30 MIN: CPT | Mod: 25

## 2019-03-06 NOTE — REASON FOR VISIT
[Follow-Up - Clinic] : a clinic follow-up of [Atrial Fibrillation] : atrial fibrillation [Cardiomyopathy] : cardiomyopathy [Dyspnea] : dyspnea

## 2019-03-11 ENCOUNTER — APPOINTMENT (OUTPATIENT)
Dept: CT IMAGING | Facility: CLINIC | Age: 69
End: 2019-03-11
Payer: MEDICARE

## 2019-03-11 ENCOUNTER — OUTPATIENT (OUTPATIENT)
Dept: OUTPATIENT SERVICES | Facility: HOSPITAL | Age: 69
LOS: 1 days | End: 2019-03-11
Payer: MEDICARE

## 2019-03-11 DIAGNOSIS — I77.810 THORACIC AORTIC ECTASIA: ICD-10-CM

## 2019-03-11 DIAGNOSIS — S82.90XA UNSPECIFIED FRACTURE OF UNSPECIFIED LOWER LEG, INITIAL ENCOUNTER FOR CLOSED FRACTURE: Chronic | ICD-10-CM

## 2019-03-11 PROCEDURE — 71275 CT ANGIOGRAPHY CHEST: CPT | Mod: 26

## 2019-03-11 PROCEDURE — 71275 CT ANGIOGRAPHY CHEST: CPT

## 2019-03-13 ENCOUNTER — APPOINTMENT (OUTPATIENT)
Dept: CARDIOTHORACIC SURGERY | Facility: CLINIC | Age: 69
End: 2019-03-13
Payer: MEDICARE

## 2019-03-13 VITALS
RESPIRATION RATE: 16 BRPM | DIASTOLIC BLOOD PRESSURE: 95 MMHG | BODY MASS INDEX: 33.11 KG/M2 | WEIGHT: 258 LBS | OXYGEN SATURATION: 99 % | HEIGHT: 74 IN | HEART RATE: 74 BPM | SYSTOLIC BLOOD PRESSURE: 144 MMHG

## 2019-03-13 PROCEDURE — 99213 OFFICE O/P EST LOW 20 MIN: CPT

## 2019-03-13 NOTE — ASSESSMENT
[FreeTextEntry1] : 68 year old male with a past medical history of atrial fibrillation on Xarelto, cardiomyopathy, hypertension, BARRON on CPAP, COPD/emphysema, ascending aorta dilatation status post minimally invasive ascending aorta and transverse arch replacement, YORDY occlusion on 2/8/18, presents for a follow up visit. \par \par CTA chest 3/11/19 revealed:\par 1.  Replacement of the ascending aorta.\par 2.  The aortic root, aortic arch, and thoracoabdominal aorta are dilated.\par The aorta measures (all measurements are in centimeters):\par *  At the sinuses of valsalva:  4.0.\par *  At the sinotubular junction:  3.3.\par *  Ascending thoracic aorta at the main pulmonary artery:  3.0.\par *  Just distal to the repair: 3.9\par *  Just proximal to the common origin of the brachiocephalic artery and \par left common carotid artery: 4.3\par *  Just distal to the origin of the left vertebral artery: 3.6\par *  At the aortic isthmus:  3.6.\par *  Descending thoracic aorta at the main pulmonary artery:  3.1.\par *  Descending thoracic aorta at the left inferior pulmonary vein:  3.1.\par *  Descending thoracic aorta at the diaphragmatic hiatus:  3.0.\par \par I have reviewed the patient's medical records, diagnostic images during the time of this office consultation and have made the following recommendation. The surgical repair is intact and stable.\par Plan\par \par 1. [facility] with CTA chest. \par 2. Continue medication regimen.\par 3. Follow up with cardiologist and PCP.\par

## 2019-03-13 NOTE — HISTORY OF PRESENT ILLNESS
[FreeTextEntry1] : 68 year old male with a past medical history of atrial fibrillation on Xarelto, cardiomyopathy, hypertension, BARRON on CPAP, COPD/emphysema, ascending aorta dilatation status post minimally invasive ascending aorta and transverse arch replacement, YORDY occlusion on 2/8/18, presents for a follow up visit. \par \par CTA chest 3/11/19 revealed:\par 1.  Replacement of the ascending aorta.\par 2.  The aortic root, aortic arch, and thoracoabdominal aorta are dilated.\par The aorta measures (all measurements are in centimeters):\par *  At the sinuses of valsalva:  4.0.\par *  At the sinotubular junction:  3.3.\par *  Ascending thoracic aorta at the main pulmonary artery:  3.0.\par *  Just distal to the repair: 3.9\par *  Just proximal to the common origin of the brachiocephalic artery and \par left common carotid artery: 4.3\par *  Just distal to the origin of the left vertebral artery: 3.6\par *  At the aortic isthmus:  3.6.\par *  Descending thoracic aorta at the main pulmonary artery:  3.1.\par *  Descending thoracic aorta at the left inferior pulmonary vein:  3.1.\par *  Descending thoracic aorta at the diaphragmatic hiatus:  3.0.\par \par Patient is doing well and denies recent hospitalization, ER visits, or surgeries. He  denies fever, chills, fatigue, headache, blurred vision, dizziness, syncope, chest pain, palpitations, shortness of breath, orthopnea, paroxysmal nocturnal dyspnea, nausea, vomiting, abdominal pain, back pain, BRBPR or swelling to legs.\par

## 2019-03-13 NOTE — END OF VISIT
[FreeTextEntry3] : Written by Ivonne Harris NP, acting as a scribe for Dr. Dimitri Ascencio.\par “The documentation recorded by the scribe accurately reflects the service I personally performed and the decisions made by me.” Signature, Dimitri Ascencio MD\par \par

## 2019-03-13 NOTE — CONSULT LETTER
[FreeTextEntry2] : Real Cespedes MD [FreeTextEntry1] : I had the pleasure of seeing your patient, JEFFRY ARRINGTON, in my office today. \par \par We take a multidisciplinary team approach to patient care and consider you, the referring physician, an extension of our team. We will maintain an open line of communication with you throughout your patient's treatment course.  \par \par As you recall, he is a 68 year old male status post minimally invasive ascending aorta and transverse arch replacement, YORDY occlusion on 2/8/18.The patient presents to the office today for a routine follow up visit with repeat diagnostic imaging. I have enclosed a copy for your records.\par \par The surgical repair is intact and stable. Therefore, I have recommended that the patient will follow up in the [facility] in one year with a CTA chest to monitor his surgical repair. My office will assist the patient with his upcoming appointment and I will update you on his  progress at that time.\par \par I have discussed with the patient that we will continue to monitor his aortic pathology closely at the [facility], that encompasses the entire health care system and is one of the largest in the nation at this point.\par \par I appreciate the opportunity to care for your patient at the [facility] based at [facility]. If there are any questions or concerns, please call me directly at [phone]. \par \par Please see my note below. \par \par \par Sincerely, \par \par \par \par \par \par \par Dimitri Ascencio M.D.\par Professor of Cardiovascular and Thoracic Surgery\par Minimally Invasive Valve Surgeon\par Director of Aortic Surgery, [facility]\par Cell: [phone]\par Email: [email or web address] \par \par [facility]:\par [address] [place] [address], [address] Floor, [place] [address]\par Office: [phone]\par Fax: [phone]\par \par [facility]:\par Department of Cardiovascular and Thoracic Surgery\par [address] [place] [address], [address]\par Office: [phone]\par Fax: [phone]\par \par Practice Manager: Ms. Marguerite Patino\par Email: [email or web address]\par Phone: [phone]\par

## 2019-03-13 NOTE — PHYSICAL EXAM
[Sclera] : the sclera and conjunctiva were normal [PERRL With Normal Accommodation] : pupils were equal in size, round, and reactive to light [Extraocular Movements] : extraocular movements were intact [Neck Appearance] : the appearance of the neck was normal [Neck Cervical Mass (___cm)] : no neck mass was observed [Jugular Venous Distention Increased] : there was no jugular-venous distention [Thyroid Diffuse Enlargement] : the thyroid was not enlarged [Thyroid Nodule] : there were no palpable thyroid nodules [Auscultation Breath Sounds / Voice Sounds] : lungs were clear to auscultation bilaterally [Heart Sounds] : normal S1 and S2 [Heart Sounds Gallop] : no gallops [Murmurs] : no murmurs [Heart Sounds Pericardial Friction Rub] : no pericardial rub [Examination Of The Chest] : the chest was normal in appearance [Chest Visual Inspection Thoracic Asymmetry] : no chest asymmetry [Diminished Respiratory Excursion] : normal chest expansion [2+] : left 2+ [Bowel Sounds] : normal bowel sounds [Abdomen Soft] : soft [Abdomen Tenderness] : non-tender [Abdomen Mass (___ Cm)] : no abdominal mass palpated [Cervical Lymph Nodes Enlarged Posterior Bilaterally] : posterior cervical [Cervical Lymph Nodes Enlarged Anterior Bilaterally] : anterior cervical [Abnormal Walk] : normal gait [Nail Clubbing] : no clubbing  or cyanosis of the fingernails [Musculoskeletal - Swelling] : no joint swelling seen [Motor Tone] : muscle strength and tone were normal [Skin Color & Pigmentation] : normal skin color and pigmentation [Skin Turgor] : normal skin turgor [] : no rash [Deep Tendon Reflexes (DTR)] : deep tendon reflexes were 2+ and symmetric [Sensation] : the sensory exam was normal to light touch and pinprick [No Focal Deficits] : no focal deficits [Oriented To Time, Place, And Person] : oriented to person, place, and time [Impaired Insight] : insight and judgment were intact [Affect] : the affect was normal [FreeTextEntry1] : deferred

## 2019-03-13 NOTE — PROCEDURE
[FreeTextEntry1] : \par \par Dr. Ascencio discussed activity restrictions with the patient, and would advise exercise at a moderate amount with no heavy lifting over one third of body weight, and avoiding heart rates that exceed 140 beats per minute. In addition, every patient should abstain from tobacco abuse and to avoid all illicit drug use, especially stimulants such as cocaine or methamphetamine. Dr. Ascencio also counseled regarding maintaining a healthy heart diet, and losing any excessive weight as this also put undue stress on both the aorta and entire cardiovascular system. First degree family members should be screened for bicuspid valve disease, and ascending aortic aneurysms. \par \par

## 2019-03-13 NOTE — DATA REVIEWED
[FreeTextEntry1] : CTA chest 3/11/19\par \par \par FINDINGS: \par \par Aorta: Left-sided aortic arch and left-sided descending thoracic aorta. \par The patient has undergone replacement of the ascending aorta. The \par proximal anastomosis is at the sinotubular junction. The distal \par anastomosis is just proximal to the hemiarch. There is a common origin of \par the left, and carotid artery and brachiocephalic trunk, normal variant. \par Left humeral artery arises from the aorta, coronary. The great vessels \par are widely patent along their imaged segments. The aortic root is \par enlarged. The aortic arch and thoracoabdominal aorta are dilated.\par \par The aorta measures (all measurements are in centimeters):\par *  At the sinuses of valsalva:  4.0.\par *  At the sinotubular junction:  3.3.\par *  Ascending thoracic aorta at the main pulmonary artery:  3.0.\par *  Just distal to the repair: 3.9\par *  Just proximal to the common origin of the brachiocephalic artery and \par left common carotid artery: 4.3\par *  Just distal to the origin of the left vertebral artery: 3.6\par *  At the aortic isthmus:  3.6.\par *  Descending thoracic aorta at the main pulmonary artery:  3.1.\par *  Descending thoracic aorta at the left inferior pulmonary vein:  3.1.\par *  Descending thoracic aorta at the diaphragmatic hiatus:  3.0.\par \par \par Tubes/Lines: None.\par \par Lungs And Airways: Subsegmental atelectasis in the bilateral lower lobes, \par right middle lobe, and lingula. The remainder of the lungs are clear. The \par airways are unremarkable.\par \par Pleura: No pleural effusion or pneumothorax.\par \par Mediastinum: There are no enlarged chest lymph nodes. The visualized \par portion of the thyroid gland is unremarkable. Hiatal hernia. \par \par Heart and Vasculature: The heart is enlarged. In particular, the left \par atrium is enlarged. The left atrial clip traverses a segment of the \par atrial appendage.\par \par Though the exam was not tailored to evaluate for pulmonary embolus there \par is no main, central, or lobar pulmonary embolus. The pulmonary artery is \par normal in caliber.\par \par Upper Abdomen: The left adrenal gland is thickened.\par \par Bones And Soft Tissues: Sternotomy. The sternum is fractured. The bony \par bridging at the sternal fracture is new since 12/15/2017. Degenerative \par change of the spine.\par \par \par IMPRESSION: \par \par 1.  Replacement of the ascending aorta.\par 2.  The aortic root, aortic arch, and thoracoabdominal aorta are dilated.\par \par \par \par \par

## 2019-03-16 NOTE — DISCUSSION/SUMMARY
[FreeTextEntry1] : Dilated Aortic root and ascending aorta: s/p repair. \par Atrial fibrillation: Rate controlled in atrial fibrillation and no current symptoms related to atrial fibrillation. Tolerating anticoagulation and will continue for now.\par Mild Chronic systolic heart failure: Discussed echo results . Tolerating current medical regime well. No dyspnea on exertion. Continue current medical therapy. No beta blocker therapy secondary to COPD. Low-sodium diet discussed. \par Hypertension:Controlled on current regime. Low sodium diet discussed And emphasized.\par Sleep apnea:Tolerating sleep mask without issues.\par Follow up 4 months

## 2019-03-16 NOTE — PHYSICAL EXAM
[General Appearance - Well Developed] : well developed [Normal Appearance] : normal appearance [Well Groomed] : well groomed [General Appearance - Well Nourished] : well nourished [No Deformities] : no deformities [General Appearance - In No Acute Distress] : no acute distress [Normal Conjunctiva] : the conjunctiva exhibited no abnormalities [Eyelids - No Xanthelasma] : the eyelids demonstrated no xanthelasmas [Normal Oral Mucosa] : normal oral mucosa [No Oral Pallor] : no oral pallor [No Oral Cyanosis] : no oral cyanosis [Normal Jugular Venous A Waves Present] : normal jugular venous A waves present [Normal Jugular Venous V Waves Present] : normal jugular venous V waves present [No Jugular Venous Nascimento A Waves] : no jugular venous nascimento A waves [Abdomen Soft] : soft [Abdomen Tenderness] : non-tender [Abdomen Mass (___ Cm)] : no abdominal mass palpated [Abnormal Walk] : normal gait [Gait - Sufficient For Exercise Testing] : the gait was sufficient for exercise testing [Nail Clubbing] : no clubbing of the fingernails [Cyanosis, Localized] : no localized cyanosis [Petechial Hemorrhages (___cm)] : no petechial hemorrhages [Skin Color & Pigmentation] : normal skin color and pigmentation [No Venous Stasis] : no venous stasis [Skin Lesions] : no skin lesions [No Skin Ulcers] : no skin ulcer [No Xanthoma] : no  xanthoma was observed [Oriented To Time, Place, And Person] : oriented to person, place, and time [Affect] : the affect was normal [Mood] : the mood was normal [No Anxiety] : not feeling anxious [] : no respiratory distress [Respiration, Rhythm And Depth] : normal respiratory rhythm and effort [Exaggerated Use Of Accessory Muscles For Inspiration] : no accessory muscle use [Auscultation Breath Sounds / Voice Sounds] : lungs were clear to auscultation bilaterally [Normal Rate] : normal [Rhythm Regular] : regular [Normal S1] : normal S1 [Normal S2] : normal S2 [I] : a grade 1 [2+] : left 2+ [No Abnormalities] : the abdominal aorta was not enlarged and no bruit was heard [No Pitting Edema] : no pitting edema present [S3] : no S3 [S4] : no S4 [Right Carotid Bruit] : no bruit heard over the right carotid [Left Carotid Bruit] : no bruit heard over the left carotid [Right Femoral Bruit] : no bruit heard over the right femoral artery [Left Femoral Bruit] : no bruit heard over the left femoral artery

## 2019-03-16 NOTE — HISTORY OF PRESENT ILLNESS
[FreeTextEntry1] : s/p aortic repair and doing very well. had atrial clip as well.  Denies chest pain, shortness of breath, dyspnea on exertion, palpitations, orthopnea, paroxysmal nocturnal dyspnea, claudication, dizziness,  lightheadedness, presyncopal, or syncopal symptoms. Gained weight.  Discussed the importance of him loosing weight and its implications on his issues.Presented to clinic today to review echo results.

## 2019-03-28 ENCOUNTER — MEDICATION RENEWAL (OUTPATIENT)
Age: 69
End: 2019-03-28

## 2019-03-28 ENCOUNTER — RX RENEWAL (OUTPATIENT)
Age: 69
End: 2019-03-28

## 2019-04-23 ENCOUNTER — RX RENEWAL (OUTPATIENT)
Age: 69
End: 2019-04-23

## 2019-07-31 ENCOUNTER — APPOINTMENT (OUTPATIENT)
Dept: CARDIOLOGY | Facility: CLINIC | Age: 69
End: 2019-07-31
Payer: MEDICARE

## 2019-07-31 ENCOUNTER — NON-APPOINTMENT (OUTPATIENT)
Age: 69
End: 2019-07-31

## 2019-07-31 VITALS
HEIGHT: 74 IN | SYSTOLIC BLOOD PRESSURE: 138 MMHG | RESPIRATION RATE: 15 BRPM | TEMPERATURE: 97.4 F | HEART RATE: 73 BPM | WEIGHT: 258 LBS | OXYGEN SATURATION: 98 % | BODY MASS INDEX: 33.11 KG/M2 | DIASTOLIC BLOOD PRESSURE: 86 MMHG

## 2019-07-31 PROCEDURE — 99214 OFFICE O/P EST MOD 30 MIN: CPT | Mod: 25

## 2019-07-31 PROCEDURE — 93000 ELECTROCARDIOGRAM COMPLETE: CPT

## 2019-07-31 RX ORDER — LISINOPRIL 20 MG/1
20 TABLET ORAL
Qty: 180 | Refills: 0 | Status: DISCONTINUED | COMMUNITY
Start: 2017-04-10 | End: 2019-07-31

## 2019-07-31 NOTE — DISCUSSION/SUMMARY
[FreeTextEntry1] : Dilated Aortic root and ascending aorta: s/p repair. being followed by CT surgery. with annual CTA.   \par Atrial fibrillation: Rate controlled in atrial fibrillation Continue Cartia , Xarelto \par Mild Chronic systolic heart failure: . Tolerating current medical regime well. No dyspnea on exertion. Continue current medical therapy. No beta blocker therapy secondary to COPD. Low-sodium diet discussed. \par Hypertension:Controlled on current regime. Low sodium diet discussed And emphasized.\par Sleep apnea:Tolerating sleep mask without issues.\par Emphasized the need to loss weight  and exercise. \par Echo before next visit. \par Follow up 1 year.

## 2019-07-31 NOTE — PHYSICAL EXAM
[General Appearance - Well Developed] : well developed [Normal Appearance] : normal appearance [Well Groomed] : well groomed [General Appearance - Well Nourished] : well nourished [No Deformities] : no deformities [General Appearance - In No Acute Distress] : no acute distress [Normal Conjunctiva] : the conjunctiva exhibited no abnormalities [Eyelids - No Xanthelasma] : the eyelids demonstrated no xanthelasmas [Normal Oral Mucosa] : normal oral mucosa [No Oral Cyanosis] : no oral cyanosis [No Oral Pallor] : no oral pallor [Normal Jugular Venous A Waves Present] : normal jugular venous A waves present [Normal Jugular Venous V Waves Present] : normal jugular venous V waves present [No Jugular Venous Nascimento A Waves] : no jugular venous nascimento A waves [Abdomen Soft] : soft [Abdomen Tenderness] : non-tender [Abdomen Mass (___ Cm)] : no abdominal mass palpated [Abnormal Walk] : normal gait [Gait - Sufficient For Exercise Testing] : the gait was sufficient for exercise testing [Nail Clubbing] : no clubbing of the fingernails [Cyanosis, Localized] : no localized cyanosis [Petechial Hemorrhages (___cm)] : no petechial hemorrhages [Skin Color & Pigmentation] : normal skin color and pigmentation [No Venous Stasis] : no venous stasis [Skin Lesions] : no skin lesions [No Skin Ulcers] : no skin ulcer [No Xanthoma] : no  xanthoma was observed [Oriented To Time, Place, And Person] : oriented to person, place, and time [Affect] : the affect was normal [Mood] : the mood was normal [No Anxiety] : not feeling anxious [] : no respiratory distress [Respiration, Rhythm And Depth] : normal respiratory rhythm and effort [Exaggerated Use Of Accessory Muscles For Inspiration] : no accessory muscle use [Auscultation Breath Sounds / Voice Sounds] : lungs were clear to auscultation bilaterally [Normal Rate] : normal [Rhythm Regular] : regular [Normal S1] : normal S1 [Normal S2] : normal S2 [I] : a grade 1 [2+] : left 2+ [No Abnormalities] : the abdominal aorta was not enlarged and no bruit was heard [No Pitting Edema] : no pitting edema present [S3] : no S3 [S4] : no S4 [Right Carotid Bruit] : no bruit heard over the right carotid [Left Carotid Bruit] : no bruit heard over the left carotid [Right Femoral Bruit] : no bruit heard over the right femoral artery [Left Femoral Bruit] : no bruit heard over the left femoral artery

## 2019-07-31 NOTE — HISTORY OF PRESENT ILLNESS
[FreeTextEntry1] : Presented for routine check up. s/p aortic repair and doing very well. had atrial clip as well.  Denies chest pain, shortness of breath, dyspnea on exertion, palpitations, orthopnea, paroxysmal nocturnal dyspnea, claudication, dizziness,  lightheadedness, presyncopal, or syncopal symptoms.

## 2019-10-21 NOTE — H&P PST ADULT - NS PRO TALK SOMEONE YN
Pt son and daughter-in-law report pt has abscess on left side of thigh for one week and pt "picked at it" per family        Zahida Flowers RN  10/20/19 7557 no

## 2020-07-29 ENCOUNTER — APPOINTMENT (OUTPATIENT)
Dept: CARDIOLOGY | Facility: CLINIC | Age: 70
End: 2020-07-29
Payer: MEDICARE

## 2020-07-29 ENCOUNTER — APPOINTMENT (OUTPATIENT)
Dept: CARDIOLOGY | Facility: CLINIC | Age: 70
End: 2020-07-29

## 2020-07-29 PROCEDURE — 93306 TTE W/DOPPLER COMPLETE: CPT

## 2020-08-05 ENCOUNTER — APPOINTMENT (OUTPATIENT)
Dept: CARDIOLOGY | Facility: CLINIC | Age: 70
End: 2020-08-05
Payer: MEDICARE

## 2020-08-05 ENCOUNTER — NON-APPOINTMENT (OUTPATIENT)
Age: 70
End: 2020-08-05

## 2020-08-05 VITALS
HEIGHT: 74 IN | OXYGEN SATURATION: 99 % | DIASTOLIC BLOOD PRESSURE: 87 MMHG | BODY MASS INDEX: 32.47 KG/M2 | WEIGHT: 253 LBS | HEART RATE: 73 BPM | SYSTOLIC BLOOD PRESSURE: 129 MMHG

## 2020-08-05 DIAGNOSIS — Z00.00 ENCOUNTER FOR GENERAL ADULT MEDICAL EXAMINATION W/OUT ABNORMAL FINDINGS: ICD-10-CM

## 2020-08-05 PROCEDURE — 99214 OFFICE O/P EST MOD 30 MIN: CPT | Mod: 25

## 2020-08-05 PROCEDURE — 93000 ELECTROCARDIOGRAM COMPLETE: CPT

## 2020-08-05 NOTE — CARDIOLOGY SUMMARY
[___] : [unfilled] [LVEF ___%] : LVEF [unfilled]% [None] : normal LV function [Mild] : mild pulmonary hypertension [Enlarged] : enlarged LA size

## 2020-08-08 LAB — SARS-COV-2 N GENE NPH QL NAA+PROBE: NOT DETECTED

## 2020-08-08 NOTE — DISCUSSION/SUMMARY
[FreeTextEntry1] : MR- Moderate to severe; has got worse since last year on echo cardiogram. Will obtain TTE to assess mitral valve. \par Dilated Aortic root and ascending aorta: s/p repair. being followed by CT surgery. with annual CTA.   \par Atrial fibrillation: Rate controlled in atrial fibrillation Continue Current medication regimen. \par Mild Chronic systolic heart failure: Tolerating current medical regime well Continue current medical therapy. No beta blocker therapy secondary to COPD. Low-sodium diet discussed. \par Hypertension:Controlled on current regime.Discussed low salt diet. \par Sleep apnea:Tolerating sleep mask without issues.\par

## 2020-08-08 NOTE — HISTORY OF PRESENT ILLNESS
[FreeTextEntry1] : Patient complaints of increased dyspnea on exertion and fatigue recently. Recent echo shows Mitral regurgitation has got worse from prior. ( moderate to severe). S/P aortic repair and atrial clip. Recent blood works done at [facility]. \par \par

## 2020-08-08 NOTE — PHYSICAL EXAM
[General Appearance - Well Developed] : well developed [Normal Appearance] : normal appearance [Well Groomed] : well groomed [General Appearance - Well Nourished] : well nourished [No Deformities] : no deformities [Normal Conjunctiva] : the conjunctiva exhibited no abnormalities [General Appearance - In No Acute Distress] : no acute distress [Normal Oral Mucosa] : normal oral mucosa [Eyelids - No Xanthelasma] : the eyelids demonstrated no xanthelasmas [No Oral Cyanosis] : no oral cyanosis [No Oral Pallor] : no oral pallor [Normal Jugular Venous A Waves Present] : normal jugular venous A waves present [No Jugular Venous Nascimento A Waves] : no jugular venous nascimento A waves [Normal Jugular Venous V Waves Present] : normal jugular venous V waves present [Abdomen Soft] : soft [Abdomen Tenderness] : non-tender [Abdomen Mass (___ Cm)] : no abdominal mass palpated [Gait - Sufficient For Exercise Testing] : the gait was sufficient for exercise testing [Abnormal Walk] : normal gait [Petechial Hemorrhages (___cm)] : no petechial hemorrhages [Cyanosis, Localized] : no localized cyanosis [Nail Clubbing] : no clubbing of the fingernails [Skin Color & Pigmentation] : normal skin color and pigmentation [Skin Lesions] : no skin lesions [No Venous Stasis] : no venous stasis [No Skin Ulcers] : no skin ulcer [Oriented To Time, Place, And Person] : oriented to person, place, and time [No Xanthoma] : no  xanthoma was observed [Affect] : the affect was normal [Mood] : the mood was normal [] : no respiratory distress [No Anxiety] : not feeling anxious [Respiration, Rhythm And Depth] : normal respiratory rhythm and effort [Exaggerated Use Of Accessory Muscles For Inspiration] : no accessory muscle use [Normal Rate] : normal [Rhythm Regular] : regular [Auscultation Breath Sounds / Voice Sounds] : lungs were clear to auscultation bilaterally [Normal S1] : normal S1 [Normal S2] : normal S2 [I] : a grade 1 [2+] : right 2+ [No Pitting Edema] : no pitting edema present [No Abnormalities] : the abdominal aorta was not enlarged and no bruit was heard [S3] : no S3 [S4] : no S4 [Right Carotid Bruit] : no bruit heard over the right carotid [Left Femoral Bruit] : no bruit heard over the left femoral artery [Left Carotid Bruit] : no bruit heard over the left carotid [Right Femoral Bruit] : no bruit heard over the right femoral artery

## 2020-08-09 NOTE — ASU PREOP CHECKLIST - IV STARTED
wash wounds with soap and water - return if you experience any signs of infection such as redness, pus, swelling, fever yes

## 2020-08-10 ENCOUNTER — OUTPATIENT (OUTPATIENT)
Dept: OUTPATIENT SERVICES | Facility: HOSPITAL | Age: 70
LOS: 1 days | Discharge: HOME | End: 2020-08-10
Payer: MEDICARE

## 2020-08-10 VITALS
OXYGEN SATURATION: 100 % | WEIGHT: 253.09 LBS | TEMPERATURE: 97 F | HEART RATE: 67 BPM | SYSTOLIC BLOOD PRESSURE: 143 MMHG | DIASTOLIC BLOOD PRESSURE: 90 MMHG | HEIGHT: 74 IN | RESPIRATION RATE: 18 BRPM

## 2020-08-10 VITALS
RESPIRATION RATE: 18 BRPM | SYSTOLIC BLOOD PRESSURE: 120 MMHG | OXYGEN SATURATION: 100 % | DIASTOLIC BLOOD PRESSURE: 72 MMHG

## 2020-08-10 DIAGNOSIS — I08.3 COMBINED RHEUMATIC DISORDERS OF MITRAL, AORTIC AND TRICUSPID VALVES: ICD-10-CM

## 2020-08-10 DIAGNOSIS — Z79.01 LONG TERM (CURRENT) USE OF ANTICOAGULANTS: ICD-10-CM

## 2020-08-10 DIAGNOSIS — I10 ESSENTIAL (PRIMARY) HYPERTENSION: ICD-10-CM

## 2020-08-10 DIAGNOSIS — I70.0 ATHEROSCLEROSIS OF AORTA: ICD-10-CM

## 2020-08-10 DIAGNOSIS — S82.90XA UNSPECIFIED FRACTURE OF UNSPECIFIED LOWER LEG, INITIAL ENCOUNTER FOR CLOSED FRACTURE: Chronic | ICD-10-CM

## 2020-08-10 DIAGNOSIS — I48.91 UNSPECIFIED ATRIAL FIBRILLATION: ICD-10-CM

## 2020-08-10 DIAGNOSIS — I34.0 NONRHEUMATIC MITRAL (VALVE) INSUFFICIENCY: ICD-10-CM

## 2020-08-10 DIAGNOSIS — I51.7 CARDIOMEGALY: ICD-10-CM

## 2020-08-10 DIAGNOSIS — Z79.82 LONG TERM (CURRENT) USE OF ASPIRIN: ICD-10-CM

## 2020-08-10 LAB
ANION GAP SERPL CALC-SCNC: 6 MMOL/L — SIGNIFICANT CHANGE UP (ref 5–17)
BUN SERPL-MCNC: 11 MG/DL — SIGNIFICANT CHANGE UP (ref 7–23)
CALCIUM SERPL-MCNC: 8.7 MG/DL — SIGNIFICANT CHANGE UP (ref 8.5–10.1)
CHLORIDE SERPL-SCNC: 109 MMOL/L — HIGH (ref 96–108)
CO2 SERPL-SCNC: 26 MMOL/L — SIGNIFICANT CHANGE UP (ref 22–31)
CREAT SERPL-MCNC: 0.88 MG/DL — SIGNIFICANT CHANGE UP (ref 0.5–1.3)
GLUCOSE SERPL-MCNC: 107 MG/DL — HIGH (ref 70–99)
HCT VFR BLD CALC: 45.8 % — SIGNIFICANT CHANGE UP (ref 39–50)
HGB BLD-MCNC: 13.5 G/DL — SIGNIFICANT CHANGE UP (ref 13–17)
MCHC RBC-ENTMCNC: 24.4 PG — LOW (ref 27–34)
MCHC RBC-ENTMCNC: 29.5 GM/DL — LOW (ref 32–36)
MCV RBC AUTO: 82.8 FL — SIGNIFICANT CHANGE UP (ref 80–100)
PLATELET # BLD AUTO: 235 K/UL — SIGNIFICANT CHANGE UP (ref 150–400)
POTASSIUM SERPL-MCNC: 4 MMOL/L — SIGNIFICANT CHANGE UP (ref 3.5–5.3)
POTASSIUM SERPL-SCNC: 4 MMOL/L — SIGNIFICANT CHANGE UP (ref 3.5–5.3)
RBC # BLD: 5.53 M/UL — SIGNIFICANT CHANGE UP (ref 4.2–5.8)
RBC # FLD: 18.3 % — HIGH (ref 10.3–14.5)
SODIUM SERPL-SCNC: 141 MMOL/L — SIGNIFICANT CHANGE UP (ref 135–145)
WBC # BLD: 10.76 K/UL — HIGH (ref 3.8–10.5)
WBC # FLD AUTO: 10.76 K/UL — HIGH (ref 3.8–10.5)

## 2020-08-10 PROCEDURE — 93312 ECHO TRANSESOPHAGEAL: CPT

## 2020-08-10 PROCEDURE — 85027 COMPLETE CBC AUTOMATED: CPT

## 2020-08-10 PROCEDURE — 36415 COLL VENOUS BLD VENIPUNCTURE: CPT

## 2020-08-10 PROCEDURE — 93312 ECHO TRANSESOPHAGEAL: CPT | Mod: 26

## 2020-08-10 PROCEDURE — 80048 BASIC METABOLIC PNL TOTAL CA: CPT

## 2020-08-10 PROCEDURE — 93320 DOPPLER ECHO COMPLETE: CPT | Mod: 26

## 2020-08-10 PROCEDURE — 93325 DOPPLER ECHO COLOR FLOW MAPG: CPT

## 2020-08-10 PROCEDURE — 93320 DOPPLER ECHO COMPLETE: CPT

## 2020-08-10 PROCEDURE — 93325 DOPPLER ECHO COLOR FLOW MAPG: CPT | Mod: 26

## 2020-08-10 RX ORDER — GABAPENTIN 400 MG/1
2 CAPSULE ORAL
Qty: 0 | Refills: 0 | DISCHARGE

## 2020-08-10 NOTE — ASU PATIENT PROFILE, ADULT - LANGUAGE ASSISTANCE NEEDED
[place]oc[place]/Yes-Patient/Caregiver accepts free interpretation services... Yes-Patient/Caregiver accepts free interpretation services...

## 2020-08-10 NOTE — PACU DISCHARGE NOTE - COMMENTS
Pt. discharged to home via wheelchair to [facility]. Pt. ambulated in room and no c/o's of dizzyness. Pt. given post op instructions. I spoke with pt.s wife about post op instructions via telephone. Pt. and pt.'s wife understand.

## 2020-08-10 NOTE — PROCEDURAL SAFETY CHECKLIST WITH OR WITHOUT SEDATION - NSPOSTCOMMENTFT_GEN_ALL_CORE
Dr. Quesada started procedure; placed Endotracheal tube down at 0858.  Pt. tolerating procedure.  10 cc of NS bubbles given at 0905. ET tube removed by Dr. Quesada at 0908. Dr. Quesada started procedure; placed Endotracheal tube down at 0858.  Echo tech in assistance. Pt. tolerating procedure.  10 cc of NS bubbles given at 0905. ET tube removed by Dr. Quesada at 0908.  Pt. comfortable post procedure. VSS.

## 2020-08-25 ENCOUNTER — APPOINTMENT (OUTPATIENT)
Dept: CARDIOLOGY | Facility: CLINIC | Age: 70
End: 2020-08-25

## 2020-08-26 ENCOUNTER — APPOINTMENT (OUTPATIENT)
Dept: CARDIOLOGY | Facility: CLINIC | Age: 70
End: 2020-08-26
Payer: MEDICARE

## 2020-08-26 ENCOUNTER — NON-APPOINTMENT (OUTPATIENT)
Age: 70
End: 2020-08-26

## 2020-08-26 VITALS
HEART RATE: 80 BPM | SYSTOLIC BLOOD PRESSURE: 133 MMHG | OXYGEN SATURATION: 99 % | WEIGHT: 254 LBS | BODY MASS INDEX: 32.6 KG/M2 | HEIGHT: 74 IN | DIASTOLIC BLOOD PRESSURE: 88 MMHG

## 2020-08-26 PROCEDURE — 93000 ELECTROCARDIOGRAM COMPLETE: CPT

## 2020-08-26 PROCEDURE — 99214 OFFICE O/P EST MOD 30 MIN: CPT | Mod: 25

## 2020-08-26 NOTE — CARDIOLOGY SUMMARY
[___] : [unfilled] [LVEF ___%] : LVEF [unfilled]% [None] : normal LV function [Enlarged] : enlarged LA size [Mild] : mild pulmonary hypertension

## 2020-08-27 NOTE — DISCUSSION/SUMMARY
[FreeTextEntry1] : MR- Moderate on SHRADDHA ; : will continue to monitor with periodic echocardiogram.  \par Dilated Aortic root and ascending aorta: s/p repair. being followed by CT surgery. with annual CTA.   \par Atrial fibrillation: Rate controlled in atrial fibrillation Continue  Cartia 240 mg and Xarelto daily.\par Mild Chronic systolic heart failure: Tolerating current medical regime well Continue current medical therapy. No beta blocker therapy secondary to COPD. Low-sodium diet discussed. \par Hypertension Will start Losartan 25mg daily  discussed low salt diet. \par Sleep apnea:Tolerating sleep mask without issues.\par OV in 2 months. \par

## 2020-08-27 NOTE — HISTORY OF PRESENT ILLNESS
[FreeTextEntry1] : s/p hospitalization for SHRADDHA to monitor Mitral valve ( moderate regurgitation) , no new symptoms.   S/P aortic repair and atrial clip. Recent blood works done at [facility]. \par \par

## 2020-08-27 NOTE — PHYSICAL EXAM
[Normal Appearance] : normal appearance [General Appearance - Well Developed] : well developed [Well Groomed] : well groomed [General Appearance - In No Acute Distress] : no acute distress [No Deformities] : no deformities [General Appearance - Well Nourished] : well nourished [Eyelids - No Xanthelasma] : the eyelids demonstrated no xanthelasmas [Normal Conjunctiva] : the conjunctiva exhibited no abnormalities [Normal Oral Mucosa] : normal oral mucosa [No Oral Cyanosis] : no oral cyanosis [No Oral Pallor] : no oral pallor [Normal Jugular Venous V Waves Present] : normal jugular venous V waves present [Normal Jugular Venous A Waves Present] : normal jugular venous A waves present [No Jugular Venous Nascimento A Waves] : no jugular venous nascimento A waves [Abdomen Soft] : soft [Abdomen Tenderness] : non-tender [Abdomen Mass (___ Cm)] : no abdominal mass palpated [Abnormal Walk] : normal gait [Gait - Sufficient For Exercise Testing] : the gait was sufficient for exercise testing [Petechial Hemorrhages (___cm)] : no petechial hemorrhages [Nail Clubbing] : no clubbing of the fingernails [Cyanosis, Localized] : no localized cyanosis [Skin Color & Pigmentation] : normal skin color and pigmentation [No Venous Stasis] : no venous stasis [Skin Lesions] : no skin lesions [No Xanthoma] : no  xanthoma was observed [No Skin Ulcers] : no skin ulcer [Mood] : the mood was normal [Oriented To Time, Place, And Person] : oriented to person, place, and time [Affect] : the affect was normal [No Anxiety] : not feeling anxious [Respiration, Rhythm And Depth] : normal respiratory rhythm and effort [] : no respiratory distress [Exaggerated Use Of Accessory Muscles For Inspiration] : no accessory muscle use [Auscultation Breath Sounds / Voice Sounds] : lungs were clear to auscultation bilaterally [Normal Rate] : normal [Rhythm Regular] : regular [Normal S1] : normal S1 [Normal S2] : normal S2 [I] : a grade 1 [No Abnormalities] : the abdominal aorta was not enlarged and no bruit was heard [2+] : left 2+ [No Pitting Edema] : no pitting edema present [S3] : no S3 [Right Carotid Bruit] : no bruit heard over the right carotid [S4] : no S4 [Left Carotid Bruit] : no bruit heard over the left carotid [Right Femoral Bruit] : no bruit heard over the right femoral artery [Left Femoral Bruit] : no bruit heard over the left femoral artery

## 2020-10-14 ENCOUNTER — APPOINTMENT (OUTPATIENT)
Dept: CARDIOTHORACIC SURGERY | Facility: CLINIC | Age: 70
End: 2020-10-14
Payer: MEDICARE

## 2020-10-14 VITALS
HEART RATE: 74 BPM | TEMPERATURE: 97.4 F | HEIGHT: 74 IN | DIASTOLIC BLOOD PRESSURE: 95 MMHG | OXYGEN SATURATION: 100 % | RESPIRATION RATE: 16 BRPM | SYSTOLIC BLOOD PRESSURE: 150 MMHG | BODY MASS INDEX: 31.44 KG/M2 | WEIGHT: 245 LBS

## 2020-10-14 PROCEDURE — 99215 OFFICE O/P EST HI 40 MIN: CPT

## 2020-10-28 ENCOUNTER — APPOINTMENT (OUTPATIENT)
Dept: CARDIOLOGY | Facility: CLINIC | Age: 70
End: 2020-10-28
Payer: MEDICARE

## 2020-10-28 ENCOUNTER — NON-APPOINTMENT (OUTPATIENT)
Age: 70
End: 2020-10-28

## 2020-10-28 VITALS
OXYGEN SATURATION: 100 % | HEIGHT: 74 IN | WEIGHT: 258 LBS | SYSTOLIC BLOOD PRESSURE: 146 MMHG | DIASTOLIC BLOOD PRESSURE: 87 MMHG | BODY MASS INDEX: 33.11 KG/M2 | HEART RATE: 72 BPM

## 2020-10-28 DIAGNOSIS — Z09 ENCOUNTER FOR FOLLOW-UP EXAMINATION AFTER COMPLETED TREATMENT FOR CONDITIONS OTHER THAN MALIGNANT NEOPLASM: ICD-10-CM

## 2020-10-28 DIAGNOSIS — M54.5 LOW BACK PAIN: ICD-10-CM

## 2020-10-28 PROCEDURE — 93000 ELECTROCARDIOGRAM COMPLETE: CPT | Mod: NC

## 2020-10-28 PROCEDURE — 99215 OFFICE O/P EST HI 40 MIN: CPT

## 2020-10-28 NOTE — DISCUSSION/SUMMARY
[Procedure Low Risk] : the procedure risk is low [Patient Intermediate Risk] : the patient is an intermediate risk [Optimized for Surgery] : the patient is optimized for surgery [As per surgery] : as per surgery [Continue] : Continue medications as currently directed [FreeTextEntry3] : Stop aspirin 1 week prior to surgery.  Restart after surgery iof cleared by surgeon.  Procedure scheduled for 11/13 (Friday).  Will stop  Xarelto Tuesday prior (3 days) and restart Saturday after if cleared by surgeon.  Last dose of Xarelto will be Monday prior to surgery on Friday.   [FreeTextEntry1] : Dago sohail op risk is 1.33% for Periop CV event.

## 2020-10-28 NOTE — PHYSICAL EXAM
[General Appearance - Well Developed] : well developed [Normal Appearance] : normal appearance [General Appearance - Well Nourished] : well nourished [Normal Conjunctiva] : the conjunctiva exhibited no abnormalities [Normal Oral Mucosa] : normal oral mucosa [No Oral Pallor] : no oral pallor [No Oral Cyanosis] : no oral cyanosis [Normal Jugular Venous V Waves Present] : normal jugular venous V waves present [Bowel Sounds] : normal bowel sounds [Abdomen Soft] : soft [Abdomen Mass (___ Cm)] : no abdominal mass palpated [FreeTextEntry1] : walks with cane [Nail Clubbing] : no clubbing of the fingernails [Cyanosis, Localized] : no localized cyanosis [Skin Color & Pigmentation] : normal skin color and pigmentation [Oriented To Time, Place, And Person] : oriented to person, place, and time [No Anxiety] : not feeling anxious [] : no respiratory distress [Auscultation Breath Sounds / Voice Sounds] : lungs were clear to auscultation bilaterally [5th Left ICS - MCL] : palpated at the 5th LICS in the midclavicular line [Diffuse] : diffuse [Normal Rate] : normal [Irregularly Irregular] : irregularly irregular [Normal S1] : normal S1 [Normal S2] : normal S2 [S3] : no S3 [S4] : no S4 [I] : a grade 1 [Right Carotid Bruit] : no bruit heard over the right carotid [Left Carotid Bruit] : no bruit heard over the left carotid [2+] : left 2+ [No Pitting Edema] : no pitting edema present

## 2020-10-28 NOTE — HISTORY OF PRESENT ILLNESS
[Preoperative Visit] : for a medical evaluation prior to surgery [Scheduled Procedure ___] : a [unfilled] [Date of Surgery ___] : on [unfilled] [Surgeon Name ___] : surgeon: [unfilled] [Stable] : Stable [Fever] : no fever [Chills] : no chills [Fatigue] : no fatigue [Chest Pain] : no chest pain [Cough] : no cough [Dyspnea] : no dyspnea [Dysuria] : no dysuria [Urinary Frequency] : no urinary frequency [Nausea] : no nausea [Vomiting] : no vomiting [Diarrhea] : no diarrhea [Abdominal Pain] : no abdominal pain [Easy Bruising] : easy bruising [Lower Extremity Swelling] : no lower extremity swelling [Poor Exercise Tolerance] : no poor exercise tolerance [Cardiovascular Disease] : cardiovascular disease [Pulmonary Disease] : no pulmonary disease [Prior Anesthesia] : Prior anesthesia [Prev Anesthesia Reaction] : no previous anesthesia reaction [Electrocardiogram] : ~T an ECG ~C was performed [Echocardiogram] : ~T an echocardiogram ~C was performed [Cardiac Catheterization  (Diagnostic)] : cardiac catheterization ~T ~C was performed [Cardiovascular Stress Test] : a cardiac stress test ~T ~C was performed [FreeTextEntry1] : Able to walk up 2 flights of stairs slow due to his back and need for use of cane.

## 2021-01-27 ENCOUNTER — NON-APPOINTMENT (OUTPATIENT)
Age: 71
End: 2021-01-27

## 2021-01-27 ENCOUNTER — APPOINTMENT (OUTPATIENT)
Dept: CARDIOLOGY | Facility: CLINIC | Age: 71
End: 2021-01-27
Payer: MEDICARE

## 2021-01-27 VITALS
BODY MASS INDEX: 33.88 KG/M2 | HEART RATE: 78 BPM | HEIGHT: 74 IN | WEIGHT: 264 LBS | OXYGEN SATURATION: 97 % | DIASTOLIC BLOOD PRESSURE: 79 MMHG | SYSTOLIC BLOOD PRESSURE: 154 MMHG

## 2021-01-27 PROCEDURE — 93000 ELECTROCARDIOGRAM COMPLETE: CPT

## 2021-01-27 PROCEDURE — 99214 OFFICE O/P EST MOD 30 MIN: CPT

## 2021-01-27 NOTE — PHYSICAL EXAM
[General Appearance - Well Developed] : well developed [Normal Appearance] : normal appearance [General Appearance - Well Nourished] : well nourished [Normal Conjunctiva] : the conjunctiva exhibited no abnormalities [Normal Oral Mucosa] : normal oral mucosa [Normal Jugular Venous V Waves Present] : normal jugular venous V waves present [Abdomen Soft] : soft [Bowel Sounds] : normal bowel sounds [FreeTextEntry1] : walks with cane [Nail Clubbing] : no clubbing of the fingernails [Cyanosis, Localized] : no localized cyanosis [Skin Color & Pigmentation] : normal skin color and pigmentation [Oriented To Time, Place, And Person] : oriented to person, place, and time [No Anxiety] : not feeling anxious [] : no respiratory distress [Auscultation Breath Sounds / Voice Sounds] : lungs were clear to auscultation bilaterally [5th Left ICS - MCL] : palpated at the 5th LICS in the midclavicular line [Diffuse] : diffuse [Normal Rate] : normal [Irregularly Irregular] : irregularly irregular [Normal S1] : normal S1 [Normal S2] : normal S2 [S3] : no S3 [S4] : no S4 [I] : a grade 1 [Right Carotid Bruit] : no bruit heard over the right carotid [Left Carotid Bruit] : no bruit heard over the left carotid [2+] : left 2+ [No Pitting Edema] : no pitting edema present

## 2021-01-27 NOTE — HISTORY OF PRESENT ILLNESS
[FreeTextEntry1] : Feels well.  Denies Chest pain, SOB, or dizziness.  BP controlled. His exertional capacity has not changed.  \par \par

## 2021-01-27 NOTE — DISCUSSION/SUMMARY
[FreeTextEntry1] : MR, Moderate: Clinically stable echocardiogram in summer to monitor.  \par Dilated Aortic root and ascending aorta: s/p repair. being followed by CT surgery. with annual CTA.   \par Atrial fibrillation: Rate controlled in atrial fibrillation Continue Cartia 240 mg and Xarelto daily.\par Mild Chronic systolic heart failure: Tolerating current medical regime well Continue current medical therapy. No beta blocker therapy secondary to COPD. Low-sodium diet discussed. \par Hypertension: Controlled on current regime. Low salt diet. \par Sleep apnea:Tolerating sleep mask without issues.\par OV in 6 months. \par

## 2021-03-03 ENCOUNTER — APPOINTMENT (OUTPATIENT)
Dept: SURGERY | Facility: CLINIC | Age: 71
End: 2021-03-03
Payer: MEDICARE

## 2021-03-03 VITALS — BODY MASS INDEX: 33.37 KG/M2 | HEIGHT: 74 IN | WEIGHT: 260 LBS | TEMPERATURE: 97.6 F

## 2021-03-03 DIAGNOSIS — Z12.11 ENCOUNTER FOR SCREENING FOR MALIGNANT NEOPLASM OF COLON: ICD-10-CM

## 2021-03-03 DIAGNOSIS — Z82.49 FAMILY HISTORY OF ISCHEMIC HEART DISEASE AND OTHER DISEASES OF THE CIRCULATORY SYSTEM: ICD-10-CM

## 2021-03-03 DIAGNOSIS — K62.5 HEMORRHAGE OF ANUS AND RECTUM: ICD-10-CM

## 2021-03-03 DIAGNOSIS — K59.09 OTHER CONSTIPATION: ICD-10-CM

## 2021-03-03 DIAGNOSIS — Z86.69 PERSONAL HISTORY OF OTHER DISEASES OF THE NERVOUS SYSTEM AND SENSE ORGANS: ICD-10-CM

## 2021-03-03 PROCEDURE — 99214 OFFICE O/P EST MOD 30 MIN: CPT

## 2021-03-03 NOTE — HISTORY OF PRESENT ILLNESS
[de-identified] : The patient continues  to experience significant constipation with passage of BRBPR. He has been eating well and denies any abdominal pain.

## 2021-03-03 NOTE — ASSESSMENT
[FreeTextEntry1] : Long discussion regarding all options and risks\par Bowel prep written and explained\par To call back to schedule colonoscopy after obtaining cardiac clearance

## 2021-03-03 NOTE — PHYSICAL EXAM
[Normal Breath Sounds] : Normal breath sounds [Normal Heart Sounds] : normal heart sounds [Normal Rate and Rhythm] : normal rate and rhythm [Abdominal Masses] : No abdominal masses [Abdomen Tenderness] : ~T ~M No abdominal tenderness [No Rash or Lesion] : No rash or lesion [de-identified] : nl [de-identified] : nl [de-identified] : nl

## 2021-03-30 ENCOUNTER — APPOINTMENT (OUTPATIENT)
Dept: CARDIOLOGY | Facility: CLINIC | Age: 71
End: 2021-03-30
Payer: MEDICARE

## 2021-03-30 ENCOUNTER — NON-APPOINTMENT (OUTPATIENT)
Age: 71
End: 2021-03-30

## 2021-03-30 VITALS
OXYGEN SATURATION: 99 % | HEART RATE: 78 BPM | SYSTOLIC BLOOD PRESSURE: 128 MMHG | HEIGHT: 74 IN | WEIGHT: 261 LBS | BODY MASS INDEX: 33.5 KG/M2 | DIASTOLIC BLOOD PRESSURE: 70 MMHG

## 2021-03-30 DIAGNOSIS — G47.33 OBSTRUCTIVE SLEEP APNEA (ADULT) (PEDIATRIC): ICD-10-CM

## 2021-03-30 DIAGNOSIS — Z99.89 OBSTRUCTIVE SLEEP APNEA (ADULT) (PEDIATRIC): ICD-10-CM

## 2021-03-30 PROCEDURE — 93000 ELECTROCARDIOGRAM COMPLETE: CPT | Mod: NC

## 2021-03-30 PROCEDURE — 99214 OFFICE O/P EST MOD 30 MIN: CPT

## 2021-03-30 NOTE — PHYSICAL EXAM
[Well Groomed] : well groomed [General Appearance - In No Acute Distress] : no acute distress [Eyelids - No Xanthelasma] : the eyelids demonstrated no xanthelasmas [] : no respiratory distress [Respiration, Rhythm And Depth] : normal respiratory rhythm and effort [Auscultation Breath Sounds / Voice Sounds] : lungs were clear to auscultation bilaterally [Edema] : no peripheral edema present [Bowel Sounds] : normal bowel sounds [Abdomen Soft] : soft [FreeTextEntry1] : Ambulates with cane [Nail Clubbing] : no clubbing of the fingernails [Cyanosis, Localized] : no localized cyanosis [Oriented To Time, Place, And Person] : oriented to person, place, and time [Impaired Insight] : insight and judgment were intact [Affect] : the affect was normal [Mood] : the mood was normal

## 2021-03-30 NOTE — HISTORY OF PRESENT ILLNESS
[FreeTextEntry1] : Tamra Paz is a 70-year-old man with a history of ascending aortic aneurysm status post surgical repair/replacement + Atrial Clip (2018), valvular heart disease (moderate mitral regurgitation, mild to moderate tricuspid regurgitation, mild aortic regurgitation), cardiomyopathy with mild LV systolic dysfunction (now improved), atrial fibrillation, obstructive sleep apnea (uses nocturnal CPAP), hypertension, and COPD who returns for cardiac examination prior to upcoming colonoscopy due to constipation and bright red blood per rectum.  He describes intermittent rectal bleeding that he thinks is related to anticoagulation in the setting of hemorrhoids - however, there is a history of past colon polyps and colonoscopy is planned.  He describes a predominately sedentary lifestyle -- participates in small household tasks but no significant exercise or aerobic activities (predominantly because of leg pain and need to walk using a cane following a past injury).  He has not been experiencing angina or dyspnea.

## 2021-03-30 NOTE — DISCUSSION/SUMMARY
[FreeTextEntry1] : \par Preoperative cardiac examination: Mr. Paz appears stable and optimized from a cardiac standpoint to proceed with upcoming colonoscopy to evaluate his intermittent rectal bleeding.  It is reasonable to temporarily discontinue anticoagulation with Xarelto for 3 days prior to the procedure and to resume it afterwards (the following day if there is a polypectomy or biopsy performed); he may need additional precautions with regards to anesthesia in a setting of known obstructive sleep apnea with nocturnal use of CPAP and established COPD.  He should continue perioperative use of diltiazem and aspirin.\par \par Atrial fibrillation: Chronic; tolerating anticoagulation; stable.\par \par Status post ascending aortic aneurysm repair: CT imaging last fall described stable aortic dimensions; periodic imaging anticipated.\par \par Mitral valve insufficiency: SHRADDHA performed in 2020 to evaluate severity of mitral valve insufficiency -- found to be moderate; no change in symptoms to suggest progression at this time.\par \par Hypertension: Satisfactory control.

## 2021-03-30 NOTE — REVIEW OF SYSTEMS
[Recent Weight Loss (___ Lbs)] : no recent weight loss [Chest Pain] : no chest pain [Palpitations] : no palpitations [see HPI] : see HPI

## 2021-04-10 DIAGNOSIS — Z12.11 ENCOUNTER FOR SCREENING FOR MALIGNANT NEOPLASM OF COLON: ICD-10-CM

## 2021-04-10 DIAGNOSIS — Z12.12 ENCOUNTER FOR SCREENING FOR MALIGNANT NEOPLASM OF COLON: ICD-10-CM

## 2021-04-12 ENCOUNTER — OUTPATIENT (OUTPATIENT)
Dept: OUTPATIENT SERVICES | Facility: HOSPITAL | Age: 71
LOS: 1 days | End: 2021-04-12
Payer: MEDICARE

## 2021-04-12 ENCOUNTER — TRANSCRIPTION ENCOUNTER (OUTPATIENT)
Age: 71
End: 2021-04-12

## 2021-04-12 DIAGNOSIS — S82.90XA UNSPECIFIED FRACTURE OF UNSPECIFIED LOWER LEG, INITIAL ENCOUNTER FOR CLOSED FRACTURE: Chronic | ICD-10-CM

## 2021-04-12 DIAGNOSIS — Z20.828 CONTACT WITH AND (SUSPECTED) EXPOSURE TO OTHER VIRAL COMMUNICABLE DISEASES: ICD-10-CM

## 2021-04-12 LAB — SARS-COV-2 RNA SPEC QL NAA+PROBE: SIGNIFICANT CHANGE UP

## 2021-04-12 PROCEDURE — U0003: CPT

## 2021-04-12 PROCEDURE — U0005: CPT

## 2021-04-13 ENCOUNTER — OUTPATIENT (OUTPATIENT)
Dept: OUTPATIENT SERVICES | Facility: HOSPITAL | Age: 71
LOS: 1 days | End: 2021-04-13
Payer: MEDICARE

## 2021-04-13 DIAGNOSIS — S82.90XA UNSPECIFIED FRACTURE OF UNSPECIFIED LOWER LEG, INITIAL ENCOUNTER FOR CLOSED FRACTURE: Chronic | ICD-10-CM

## 2021-04-13 DIAGNOSIS — Z86.010 PERSONAL HISTORY OF COLONIC POLYPS: ICD-10-CM

## 2021-04-13 DIAGNOSIS — Z12.11 ENCOUNTER FOR SCREENING FOR MALIGNANT NEOPLASM OF COLON: ICD-10-CM

## 2021-04-13 DIAGNOSIS — K62.5 HEMORRHAGE OF ANUS AND RECTUM: ICD-10-CM

## 2021-04-13 PROCEDURE — G0105: CPT

## 2021-04-13 RX ORDER — SODIUM CHLORIDE 9 MG/ML
500 INJECTION INTRAMUSCULAR; INTRAVENOUS; SUBCUTANEOUS
Refills: 0 | Status: DISCONTINUED | OUTPATIENT
Start: 2021-04-13 | End: 2021-04-28

## 2021-07-27 ENCOUNTER — NON-APPOINTMENT (OUTPATIENT)
Age: 71
End: 2021-07-27

## 2021-07-27 ENCOUNTER — APPOINTMENT (OUTPATIENT)
Dept: CARDIOLOGY | Facility: CLINIC | Age: 71
End: 2021-07-27
Payer: MEDICARE

## 2021-07-27 VITALS
HEIGHT: 74 IN | BODY MASS INDEX: 32.85 KG/M2 | SYSTOLIC BLOOD PRESSURE: 129 MMHG | OXYGEN SATURATION: 97 % | HEART RATE: 79 BPM | WEIGHT: 256 LBS | DIASTOLIC BLOOD PRESSURE: 85 MMHG

## 2021-07-27 PROCEDURE — 93000 ELECTROCARDIOGRAM COMPLETE: CPT

## 2021-07-27 PROCEDURE — 99214 OFFICE O/P EST MOD 30 MIN: CPT

## 2021-07-27 RX ORDER — SODIUM PICOSULFATE, MAGNESIUM OXIDE, AND ANHYDROUS CITRIC ACID 10; 3.5; 12 MG/160ML; G/160ML; G/160ML
10-3.5-12 MG-GM LIQUID ORAL
Qty: 1 | Refills: 0 | Status: DISCONTINUED | COMMUNITY
Start: 2021-04-10 | End: 2021-07-27

## 2021-07-27 RX ORDER — SODIUM SULFATE, POTASSIUM SULFATE, MAGNESIUM SULFATE 17.5; 3.13; 1.6 G/ML; G/ML; G/ML
17.5-3.13-1.6 SOLUTION, CONCENTRATE ORAL
Qty: 1 | Refills: 0 | Status: DISCONTINUED | COMMUNITY
Start: 2021-04-10 | End: 2021-07-27

## 2021-07-27 RX ORDER — SODIUM PICOSULFATE, MAGNESIUM OXIDE, AND ANHYDROUS CITRIC ACID 10; 3.5; 12 MG/160ML; G/160ML; G/160ML
10-3.5-12 MG-GM LIQUID ORAL
Qty: 1 | Refills: 0 | Status: DISCONTINUED | COMMUNITY
Start: 2021-03-03 | End: 2021-07-27

## 2021-07-27 NOTE — DISCUSSION/SUMMARY
[With Me] : with me [___ Month(s)] : in [unfilled] month(s) [FreeTextEntry1] : \par Atrial fibrillation: Chronic / controlled (heart rate okay, tolerating anticoagulation); continue diltiazem and Xarelto.\par \par Status post ascending aortic aneurysm repair: Stable; blood pressure is controlled.\par \par Mitral valve insufficiency: Patient has moderate mitral valve insufficiency - no clear symptoms at this time; anticipate periodic imaging to assess for interval change in valve function.\par \par Hypertension: Controlled; continue losartan and Cartia; I recommended weight loss.\par \par COPD with emphysema:  Stable; patient describes regular care with Dr. Christy\par

## 2021-07-27 NOTE — REASON FOR VISIT
[Arrhythmia/ECG Abnorrmalities] : arrhythmia/ECG abnormalities [Structural Heart and Valve Disease] : structural heart and valve disease [Hypertension] : hypertension [Spouse] : spouse

## 2021-07-27 NOTE — PHYSICAL EXAM
[Well Groomed] : well groomed [General Appearance - In No Acute Distress] : no acute distress [] : no respiratory distress [Respiration, Rhythm And Depth] : normal respiratory rhythm and effort [Auscultation Breath Sounds / Voice Sounds] : lungs were clear to auscultation bilaterally [Edema] : no peripheral edema present [Bowel Sounds] : normal bowel sounds [Abdomen Soft] : soft [Nail Clubbing] : no clubbing of the fingernails [Cyanosis, Localized] : no localized cyanosis [Oriented To Time, Place, And Person] : oriented to person, place, and time [Impaired Insight] : insight and judgment were intact [Affect] : the affect was normal [Mood] : the mood was normal [FreeTextEntry1] : Ambulates with cane

## 2021-07-27 NOTE — REVIEW OF SYSTEMS
[Weight Loss (___ Lbs)] : [unfilled] ~Ulb weight loss [Chest Discomfort] : no chest discomfort [SOB] : no shortness of breath [Palpitations] : no palpitations [Cough] : no cough

## 2021-07-27 NOTE — HISTORY OF PRESENT ILLNESS
[FreeTextEntry1] : Tamra Paz is a 71-year-old man with a history of ascending aortic aneurysm status post surgical repair/replacement + Atrial Clip (2018), valvular heart disease (moderate mitral regurgitation, mild to moderate tricuspid regurgitation, mild aortic regurgitation), cardiomyopathy with mild LV systolic dysfunction (now improved), atrial fibrillation, obstructive sleep apnea (uses nocturnal CPAP), hypertension, and COPD who returns for cardiac examination.  He has been feeling well since our last encounter several months ago - says his colonoscopy went well with normal findings.  He offers no new complaints during today's visit.  He denies dyspnea, angina, palpitations.

## 2021-08-02 ENCOUNTER — APPOINTMENT (OUTPATIENT)
Dept: CARDIOLOGY | Facility: CLINIC | Age: 71
End: 2021-08-02
Payer: MEDICARE

## 2021-08-02 PROCEDURE — 93306 TTE W/DOPPLER COMPLETE: CPT

## 2021-08-04 ENCOUNTER — NON-APPOINTMENT (OUTPATIENT)
Age: 71
End: 2021-08-04

## 2021-08-17 LAB
25(OH)D3 SERPL-MCNC: 49.4 NG/ML
ALBUMIN SERPL ELPH-MCNC: 4.4 G/DL
ALP BLD-CCNC: 77 U/L
ALT SERPL-CCNC: 16 U/L
ANION GAP SERPL CALC-SCNC: 11 MMOL/L
AST SERPL-CCNC: 21 U/L
BASOPHILS # BLD AUTO: 0.09 K/UL
BASOPHILS NFR BLD AUTO: 1 %
BILIRUB SERPL-MCNC: 0.7 MG/DL
BUN SERPL-MCNC: 12 MG/DL
CALCIUM SERPL-MCNC: 9.1 MG/DL
CHLORIDE SERPL-SCNC: 105 MMOL/L
CHOLEST SERPL-MCNC: 167 MG/DL
CO2 SERPL-SCNC: 24 MMOL/L
CREAT SERPL-MCNC: 0.95 MG/DL
EOSINOPHIL # BLD AUTO: 0.22 K/UL
EOSINOPHIL NFR BLD AUTO: 2.4 %
ESTIMATED AVERAGE GLUCOSE: 117 MG/DL
GLUCOSE SERPL-MCNC: 116 MG/DL
HBA1C MFR BLD HPLC: 5.7 %
HCT VFR BLD CALC: 46.4 %
HDLC SERPL-MCNC: 72 MG/DL
HGB BLD-MCNC: 14 G/DL
IMM GRANULOCYTES NFR BLD AUTO: 0.7 %
LDLC SERPL CALC-MCNC: 80 MG/DL
LYMPHOCYTES # BLD AUTO: 2.02 K/UL
LYMPHOCYTES NFR BLD AUTO: 22.3 %
MAN DIFF?: NORMAL
MCHC RBC-ENTMCNC: 25.9 PG
MCHC RBC-ENTMCNC: 30.2 GM/DL
MCV RBC AUTO: 85.9 FL
MONOCYTES # BLD AUTO: 0.73 K/UL
MONOCYTES NFR BLD AUTO: 8.1 %
NEUTROPHILS # BLD AUTO: 5.92 K/UL
NEUTROPHILS NFR BLD AUTO: 65.5 %
NONHDLC SERPL-MCNC: 95 MG/DL
PLATELET # BLD AUTO: 190 K/UL
POTASSIUM SERPL-SCNC: 4.5 MMOL/L
PROT SERPL-MCNC: 7 G/DL
RBC # BLD: 5.4 M/UL
RBC # FLD: 18.1 %
SODIUM SERPL-SCNC: 141 MMOL/L
TRIGL SERPL-MCNC: 76 MG/DL
WBC # FLD AUTO: 9.04 K/UL

## 2021-08-19 ENCOUNTER — NON-APPOINTMENT (OUTPATIENT)
Age: 71
End: 2021-08-19

## 2021-11-10 ENCOUNTER — RX RENEWAL (OUTPATIENT)
Age: 71
End: 2021-11-10

## 2021-11-11 ENCOUNTER — RX RENEWAL (OUTPATIENT)
Age: 71
End: 2021-11-11

## 2022-01-25 ENCOUNTER — APPOINTMENT (OUTPATIENT)
Dept: CARDIOLOGY | Facility: CLINIC | Age: 72
End: 2022-01-25
Payer: MEDICARE

## 2022-01-25 ENCOUNTER — NON-APPOINTMENT (OUTPATIENT)
Age: 72
End: 2022-01-25

## 2022-01-25 VITALS
HEIGHT: 74 IN | HEART RATE: 87 BPM | WEIGHT: 260 LBS | BODY MASS INDEX: 33.37 KG/M2 | SYSTOLIC BLOOD PRESSURE: 130 MMHG | OXYGEN SATURATION: 98 % | DIASTOLIC BLOOD PRESSURE: 80 MMHG

## 2022-01-25 DIAGNOSIS — Z01.810 ENCOUNTER FOR PREPROCEDURAL CARDIOVASCULAR EXAMINATION: ICD-10-CM

## 2022-01-25 PROCEDURE — 99214 OFFICE O/P EST MOD 30 MIN: CPT

## 2022-01-25 PROCEDURE — 93000 ELECTROCARDIOGRAM COMPLETE: CPT

## 2022-01-25 NOTE — DISCUSSION/SUMMARY
[With Me] : with me [___ Month(s)] : in [unfilled] month(s) [FreeTextEntry1] : \par Atrial fibrillation: Permanent; controlled / stable;  continue diltiazem and Xarelto.\par \par Status post ascending aortic aneurysm repair: Stable - 2021 TTE reviewed and discussed with patient; blood pressure is controlled; anticipate CT imaging later this year\par \par Mitral valve insufficiency: Mild to moderate mitral valve insufficiency (not worsening on most recent TTE); continue observation.\par \par Hypertension: Controlled; continue losartan and diltiazem\par \par

## 2022-01-25 NOTE — PHYSICAL EXAM
[No Acute Distress] : no acute distress [Obese] : obese [Normal S1, S2] : normal S1, S2 [Clear Lung Fields] : clear lung fields [Soft] : abdomen soft [No Edema] : no edema [Alert and Oriented] : alert and oriented [de-identified] : No JVD [de-identified] : Irregular [de-identified] : Central obesity

## 2022-01-25 NOTE — HISTORY OF PRESENT ILLNESS
[FreeTextEntry1] : Ayden Paz is a 71-year-old man with a history of ascending aortic aneurysm status post surgical repair/replacement + Atrial Clip (2018), valvular heart disease (mild-moderate mitral regurgitation, moderate tricuspid regurgitation, mild aortic regurgitation), cardiomyopathy with mild LV systolic dysfunction (now improved), atrial fibrillation, obstructive sleep apnea (uses nocturnal CPAP), hypertension, and COPD who returns for cardiac examination - our last encounter was in July 2021.  He offers no new complaints during today's visit (accompanied by his wife who also confirmed that he has been doing well).  He has not been experiencing chest pain, shortness of breath, or palpitations.

## 2022-01-25 NOTE — REVIEW OF SYSTEMS
[SOB] : no shortness of breath [Dyspnea on exertion] : not dyspnea during exertion [Chest Discomfort] : no chest discomfort [Palpitations] : no palpitations [Cough] : no cough

## 2022-06-17 NOTE — DISCHARGE NOTE ADULT - NS DC INTERPRETER YES NO
CHIEF COMPLAINT  Eye Problem and Office Visit    Pauline Buckley is a 45 year old female who presents for glaucoma testing, history of glaucoma suspect, diagnosed at outside clinic, due to increased IOP.     HISTORY OF PRESENT ILLNESS  I have reviewed and appreciated the technician's history and test results as outlined above.    VISION  Visual Acuity (Snellen - Linear)       Right Left    Dist cc 20/20 20/20        VISUAL FIELD: 24-2  OD: MD: -3.3, few depressed points on total deviation, pattern deviation clear  OS: MD: -3.9, mild depression inf >sup on total deviation, pattern deviation clear     OPTICAL COHERENCE TOMOGRAPHY: RNFL  OD: avg RNFL 100um, no thinning  OS: avg RNFL 91um, possible thinning 2/3 o'clock, otherwise all clock hours within expected values    OCT: Ganglion Cell Analysis  OD: avg GCL + IPL 78um, no thinning  OS: avg GCL + IPL 79um, no thinning     PACHYMETRY  OD: 540um (slightly below avg thickness, +1 IOP adjustment)  OS: 550um (avg thickness, no IOP adjustment    ASSESSMENT   1. Glaucoma suspect of both eyes      PLAN   1. Optic nerve heads healthy on dilated exam last month but was diagnosed as glaucoma suspect at outside clinic and never completed baseline testing. Due to IOP slightly above normal range, glaucoma work-up completed. Testing shows no significant glaucomatous VF defects or thinning on OCT. Pachymetry near avg. IOP at this follow-up high normal OU. Will continue to monitor at this time without treatment. Follow-up 1 year for comprehensive eye exam or sooner if needed.    Discussed exam findings with patient.  Patient reassured and has no further questions.     Follow up in 1 year for comprehensive eye exam or sooner if needed.   No

## 2022-07-26 ENCOUNTER — APPOINTMENT (OUTPATIENT)
Dept: CARDIOLOGY | Facility: CLINIC | Age: 72
End: 2022-07-26

## 2022-07-26 ENCOUNTER — NON-APPOINTMENT (OUTPATIENT)
Age: 72
End: 2022-07-26

## 2022-07-26 VITALS
HEIGHT: 74 IN | OXYGEN SATURATION: 98 % | DIASTOLIC BLOOD PRESSURE: 86 MMHG | WEIGHT: 266 LBS | BODY MASS INDEX: 34.14 KG/M2 | SYSTOLIC BLOOD PRESSURE: 140 MMHG | HEART RATE: 80 BPM

## 2022-07-26 VITALS — SYSTOLIC BLOOD PRESSURE: 138 MMHG | DIASTOLIC BLOOD PRESSURE: 82 MMHG

## 2022-07-26 PROCEDURE — 93306 TTE W/DOPPLER COMPLETE: CPT

## 2022-07-26 PROCEDURE — 99214 OFFICE O/P EST MOD 30 MIN: CPT

## 2022-07-26 PROCEDURE — 93000 ELECTROCARDIOGRAM COMPLETE: CPT

## 2022-07-26 NOTE — DISCUSSION/SUMMARY
[With Me] : with me [___ Month(s)] : in [unfilled] month(s) [FreeTextEntry1] : \par Atrial fibrillation: Permanent; controlled; continue present doses of diltiazem and Xarelto.\par \par Status post ascending aortic aneurysm repair: Stable;  due for CT chest ( surveillance -  last imaging was approximately 2 years ago)\par \par Mitral  and aortic valve insufficiency: Mild to moderate  in severity;  I recommend echocardiography and continued  observation if without significant change.\par \par Hypertension: Controlled; continue losartan and diltiazem\par \par  COPD: Chronic and stable --  under the care of Dr. Farooq Christy.\par \par

## 2022-07-26 NOTE — PHYSICAL EXAM
[No Acute Distress] : no acute distress [Obese] : obese [Normal S1, S2] : normal S1, S2 [Clear Lung Fields] : clear lung fields [Soft] : abdomen soft [Alert and Oriented] : alert and oriented [de-identified] : Irregular, normal rate [de-identified] : Central obesity

## 2022-07-26 NOTE — HISTORY OF PRESENT ILLNESS
[FreeTextEntry1] : Ayden Paz is a 72-year-old man with a history of ascending aortic aneurysm s/p surgical repair/replacement + Atrial Clip (2018), valvular heart disease (mild-moderate mitral regurgitation, moderate tricuspid regurgitation, mild aortic regurgitation), cardiomyopathy with mild LV systolic dysfunction (now improved), atrial fibrillation, obstructive sleep apnea (uses nocturnal CPAP), hypertension, and COPD who returns for cardiac examination.  He has been feeling well since I last saw him – active with gardening; no angina or dyspnea.

## 2022-08-06 ENCOUNTER — RX RENEWAL (OUTPATIENT)
Age: 72
End: 2022-08-06

## 2022-08-09 LAB
ALBUMIN SERPL ELPH-MCNC: 4.4 G/DL
ALP BLD-CCNC: 80 U/L
ALT SERPL-CCNC: 22 U/L
ANION GAP SERPL CALC-SCNC: 13 MMOL/L
AST SERPL-CCNC: 28 U/L
BASOPHILS # BLD AUTO: 0.08 K/UL
BASOPHILS NFR BLD AUTO: 0.9 %
BILIRUB SERPL-MCNC: 0.6 MG/DL
BUN SERPL-MCNC: 12 MG/DL
CALCIUM SERPL-MCNC: 9.2 MG/DL
CHLORIDE SERPL-SCNC: 105 MMOL/L
CHOLEST SERPL-MCNC: 151 MG/DL
CO2 SERPL-SCNC: 23 MMOL/L
CREAT SERPL-MCNC: 0.96 MG/DL
EGFR: 83 ML/MIN/1.73M2
EOSINOPHIL # BLD AUTO: 0.2 K/UL
EOSINOPHIL NFR BLD AUTO: 2.2 %
ESTIMATED AVERAGE GLUCOSE: 117 MG/DL
GLUCOSE SERPL-MCNC: 104 MG/DL
HBA1C MFR BLD HPLC: 5.7 %
HCT VFR BLD CALC: 44.1 %
HDLC SERPL-MCNC: 70 MG/DL
HGB BLD-MCNC: 13.3 G/DL
IMM GRANULOCYTES NFR BLD AUTO: 0.6 %
LDLC SERPL CALC-MCNC: 68 MG/DL
LYMPHOCYTES # BLD AUTO: 1.94 K/UL
LYMPHOCYTES NFR BLD AUTO: 21.7 %
MAN DIFF?: NORMAL
MCHC RBC-ENTMCNC: 25.1 PG
MCHC RBC-ENTMCNC: 30.2 GM/DL
MCV RBC AUTO: 83.2 FL
MONOCYTES # BLD AUTO: 0.9 K/UL
MONOCYTES NFR BLD AUTO: 10.1 %
NEUTROPHILS # BLD AUTO: 5.76 K/UL
NEUTROPHILS NFR BLD AUTO: 64.5 %
NONHDLC SERPL-MCNC: 81 MG/DL
PLATELET # BLD AUTO: 216 K/UL
POTASSIUM SERPL-SCNC: 4.6 MMOL/L
PROT SERPL-MCNC: 6.8 G/DL
RBC # BLD: 5.3 M/UL
RBC # FLD: 19 %
SODIUM SERPL-SCNC: 140 MMOL/L
TRIGL SERPL-MCNC: 67 MG/DL
WBC # FLD AUTO: 8.93 K/UL

## 2022-10-13 PROBLEM — I51.89 MILD LEFT VENTRICULAR SYSTOLIC DYSFUNCTION: Status: RESOLVED | Noted: 2018-01-10 | Resolved: 2018-04-11

## 2022-11-22 ENCOUNTER — EMERGENCY (EMERGENCY)
Facility: HOSPITAL | Age: 72
LOS: 1 days | Discharge: HOME | End: 2022-11-22
Attending: EMERGENCY MEDICINE | Admitting: INTERNAL MEDICINE
Payer: MEDICARE

## 2022-11-22 VITALS
TEMPERATURE: 98 F | RESPIRATION RATE: 18 BRPM | HEART RATE: 74 BPM | SYSTOLIC BLOOD PRESSURE: 121 MMHG | WEIGHT: 244.93 LBS | OXYGEN SATURATION: 96 % | HEIGHT: 74 IN | DIASTOLIC BLOOD PRESSURE: 68 MMHG

## 2022-11-22 DIAGNOSIS — S82.90XA UNSPECIFIED FRACTURE OF UNSPECIFIED LOWER LEG, INITIAL ENCOUNTER FOR CLOSED FRACTURE: Chronic | ICD-10-CM

## 2022-11-22 PROCEDURE — 99282 EMERGENCY DEPT VISIT SF MDM: CPT

## 2022-11-22 PROCEDURE — 99282 EMERGENCY DEPT VISIT SF MDM: CPT | Mod: FS

## 2022-11-22 NOTE — ED PROVIDER NOTE - NSFOLLOWUPINSTRUCTIONS_ED_ALL_ED_FT
Keep the dressing on for 48 hours.  Then clean wound daily.  Apply bacitracin and keep covered.    Please follow-up with your doctor(s) within the next 3 days, but see medical sooner if your symptoms persist or worsen.  Please call tomorrow for an appointment.    If you have any worsening of symptoms or any other concerns please see your doctor or return to the ED immediately.    Please continue taking your home medications as directed.  Do not use alcohol when taking any medication (especially antibiotics, tylenol or other pain medication) unless you check with the doctor or pharmacist.

## 2022-11-22 NOTE — ED PROVIDER NOTE - PATIENT PORTAL LINK FT
You can access the FollowMyHealth Patient Portal offered by [facility] by registering at the following website: [email or web address]. By joining [employer]’s FollowMyHealth portal, you will also be able to view your health information using other applications (apps) compatible with our system.

## 2022-11-22 NOTE — ED PROVIDER NOTE - ATTENDING APP SHARED VISIT CONTRIBUTION OF CARE
This was shared visit with ZACHARY. I have reviewed and verified the documentation and independently performed the documented history, exam and mdm  Patient 72-year-old male on Xarelto being treated for a fungal infection of his right great toe presents with bleeding of his toe after the toenail fell off today in the shower.  By the time he arrived in the ED bleeding had slowed down.  Requesting evaluation.  No other complaints

## 2022-11-22 NOTE — ED ADULT TRIAGE NOTE - CHIEF COMPLAINT QUOTE
Pt being treated for fungus to right big toe and had the nail come off in the shower with uncontrolled bleeding. Pt takes Xarelto.

## 2022-11-22 NOTE — ED PROVIDER NOTE - OBJECTIVE STATEMENT
Patient 72-year-old male on Xarelto being treated for a fungal infection of his right great toe presents with bleeding of his toe after the toenail fell off today in the shower.  By the time he arrived in the ED bleeding had slowed down.  Requesting evaluation.  No other complaints

## 2022-11-22 NOTE — ED PROVIDER NOTE - PHYSICAL EXAMINATION
Gen: Well appearing in NAD  Head: NC/AT  Neck: trachea midline  Resp:  No distress  Ext: right great toe: from, cap refill <3, sensation intact  Neuro:  A&O appears non focal  Skin:  right great toe: toenail missing and minimal bleeding  Psych:  Normal affect and mood

## 2022-11-22 NOTE — ED ADULT NURSE NOTE - NSICDXPASTMEDICALHX_GEN_ALL_CORE_FT
PAST MEDICAL HISTORY:  Abnormal stress test     Ascending aorta dilation 5.0 cms    Atrial fibrillation xarelto    COPD with emphysema h/o  on advair    Hypertension     LVH (left ventricular hypertrophy)     Mitral valve insufficiency     Mitral valve insufficiency     BARRON (obstructive sleep apnea) c-pap    TR (tricuspid regurgitation)

## 2022-11-22 NOTE — ED PROVIDER NOTE - CLINICAL SUMMARY MEDICAL DECISION MAKING FREE TEXT BOX
Patient 72-year-old male on Xarelto being treated for a fungal infection of his right great toe presents with bleeding of his toe after the toenail fell off today in the shower.  By the time he arrived in the ED bleeding had slowed down.  Requesting evaluation.  No other complaints  surgicel applied with pressure dressing  Discussed with patient need to return to ED if symptoms don't continue to improve or recur or develops any new or worsening symptoms that are of concern.

## 2022-12-03 NOTE — CHART NOTE - NSCHARTNOTEFT_GEN_A_CORE
Sw called pt to discuss and assist with follow up care.  Pt is an 71 y/o male presented to ED for toe bleeding.  Wife reported that pt is feeling little better and pt had an appt with Podiatrist Dr. Gardner on 11/23/22.  Pt encouraged to call SW if further assistance is needed.

## 2023-01-11 ENCOUNTER — RX RENEWAL (OUTPATIENT)
Age: 73
End: 2023-01-11

## 2023-01-15 NOTE — PRE-OP CHECKLIST - 1.
If you are a smoker, it is important for your health to stop smoking. Please be aware that second hand smoke is also harmful.
Preop teaching done and emotional support provided to patient and family. Safety provided.

## 2023-01-31 ENCOUNTER — APPOINTMENT (OUTPATIENT)
Dept: CARDIOLOGY | Facility: CLINIC | Age: 73
End: 2023-01-31
Payer: MEDICARE

## 2023-01-31 ENCOUNTER — NON-APPOINTMENT (OUTPATIENT)
Age: 73
End: 2023-01-31

## 2023-01-31 VITALS
OXYGEN SATURATION: 98 % | DIASTOLIC BLOOD PRESSURE: 80 MMHG | SYSTOLIC BLOOD PRESSURE: 132 MMHG | HEART RATE: 80 BPM | WEIGHT: 262 LBS | BODY MASS INDEX: 33.64 KG/M2

## 2023-01-31 VITALS
RESPIRATION RATE: 14 BRPM | DIASTOLIC BLOOD PRESSURE: 80 MMHG | HEART RATE: 80 BPM | OXYGEN SATURATION: 98 % | HEIGHT: 74 IN | BODY MASS INDEX: 33.62 KG/M2 | WEIGHT: 262 LBS | SYSTOLIC BLOOD PRESSURE: 132 MMHG

## 2023-01-31 DIAGNOSIS — E66.9 OBESITY, UNSPECIFIED: ICD-10-CM

## 2023-01-31 DIAGNOSIS — I77.810 THORACIC AORTIC ECTASIA: ICD-10-CM

## 2023-01-31 PROCEDURE — 99214 OFFICE O/P EST MOD 30 MIN: CPT

## 2023-01-31 PROCEDURE — 93000 ELECTROCARDIOGRAM COMPLETE: CPT

## 2023-01-31 NOTE — PHYSICAL EXAM
[Obese] : obese [Normal S1, S2] : normal S1, S2 [Clear Lung Fields] : clear lung fields [Soft] : abdomen soft [Alert and Oriented] : alert and oriented [No Edema] : no edema [de-identified] :   Wearing a facemask [de-identified] : Irregularly irregular, normal rate

## 2023-01-31 NOTE — HISTORY OF PRESENT ILLNESS
[FreeTextEntry1] : Ayden Paz is a 72-year-old man with a history of ascending aortic aneurysm s/p surgical repair/replacement + Atrial Clip (2018), valvular heart, cardiomyopathy with mild LV systolic dysfunction (now improved), atrial fibrillation, obstructive sleep apnea (uses nocturnal CPAP), hypertension, and COPD who returns for cardiac examination.  He has been doing well from a cardiovascular standpoint and offers no new complaints of dyspnea or chest discomfort; tolerating prescribed pharmacotherapy.  He complains of numbness in his distal extremities (toes and fingers)–seeing a vascular surgeon and neurologist.  He continues to tolerate anticoagulation.

## 2023-01-31 NOTE — DISCUSSION/SUMMARY
[With Me] : with me [___ Month(s)] : in [unfilled] month(s) [FreeTextEntry1] : \par Atrial fibrillation: Permanent; controlled; continue diltiazem and Xarelto.\par \par Status post ascending aortic aneurysm repair: Stable appearance on CT imaging.\par \par Mitral  and aortic valve insufficiency: Mild to moderate  valvular insufficiencies; stable on echocardiogram (last performed in 2022); continued observation appropriate.\par \par Hypertension: Controlled; continue losartan and diltiazem\par \par COPD: Stable --  sees Dr. Christy.\par \par Obesity: I again urged diet and weight loss.\par \par

## 2023-01-31 NOTE — REVIEW OF SYSTEMS
[Weight Loss (___ Lbs)] : [unfilled] ~Ulb weight loss [SOB] : no shortness of breath [Dyspnea on exertion] : not dyspnea during exertion [Chest Discomfort] : no chest discomfort [Palpitations] : no palpitations [Numbness (Hypoesthesia)] : numbness [Tingling (Paresthesia)] : tingling

## 2023-01-31 NOTE — CARDIOLOGY SUMMARY
[de-identified] : 1/31/2023.  Atrial fibrillation.  Nonspecific ST abnormality. [de-identified] : 7/26/22.  Normal left ventricular size and function with estimated ejection fraction 57%.  Moderate concentric LVH.  Mild aortic root dilation with ascending aortic aneurysm repair.  Severe left atrial enlargement.  Aortic valve sclerosis with mild regurgitation.  Mild to moderate mitral regurgitation.  Moderate tricuspid regurgitation.  Mild to moderate pulmonic regurgitation. [de-identified] : 7/28/2022.  CT Chest/Aorta: Stable ascending thoracic aorta repair. [de-identified] : 1/2/18, Mild diffuse disease with mild to moderate coronary ectasia. LVEF of 45% \par  [de-identified] : 2/8/18, Minimally invasive ascending aorta replacement, transverse arch replacement, left atrial appendage occlusion using an AtriClip \par

## 2023-03-02 RX ADMIN — CEFTRIAXONE 1000 MILLIGRAM(S): 500 INJECTION, POWDER, FOR SOLUTION INTRAMUSCULAR; INTRAVENOUS at 09:02

## 2023-03-12 ENCOUNTER — INPATIENT (INPATIENT)
Facility: HOSPITAL | Age: 73
LOS: 2 days | Discharge: HOME | DRG: 871 | End: 2023-03-15
Attending: HOSPITALIST | Admitting: HOSPITALIST
Payer: MEDICARE

## 2023-03-12 VITALS
HEIGHT: 75 IN | RESPIRATION RATE: 22 BRPM | SYSTOLIC BLOOD PRESSURE: 148 MMHG | HEART RATE: 94 BPM | DIASTOLIC BLOOD PRESSURE: 82 MMHG | TEMPERATURE: 100 F | OXYGEN SATURATION: 96 % | WEIGHT: 257.06 LBS

## 2023-03-12 DIAGNOSIS — J18.9 PNEUMONIA, UNSPECIFIED ORGANISM: ICD-10-CM

## 2023-03-12 DIAGNOSIS — S82.90XA UNSPECIFIED FRACTURE OF UNSPECIFIED LOWER LEG, INITIAL ENCOUNTER FOR CLOSED FRACTURE: Chronic | ICD-10-CM

## 2023-03-12 DIAGNOSIS — Z98.890 OTHER SPECIFIED POSTPROCEDURAL STATES: Chronic | ICD-10-CM

## 2023-03-12 LAB
ALBUMIN SERPL ELPH-MCNC: 3.4 G/DL — SIGNIFICANT CHANGE UP (ref 3.3–5)
ALP SERPL-CCNC: 75 U/L — SIGNIFICANT CHANGE UP (ref 40–120)
ALT FLD-CCNC: 25 U/L — SIGNIFICANT CHANGE UP (ref 10–45)
ANION GAP SERPL CALC-SCNC: 12 MMOL/L — SIGNIFICANT CHANGE UP (ref 5–17)
APPEARANCE UR: CLEAR — SIGNIFICANT CHANGE UP
APTT BLD: 37.9 SEC — HIGH (ref 27.5–35.5)
AST SERPL-CCNC: 28 U/L — SIGNIFICANT CHANGE UP (ref 10–40)
BACTERIA # UR AUTO: NEGATIVE /HPF — SIGNIFICANT CHANGE UP
BASOPHILS # BLD AUTO: 0.04 K/UL — SIGNIFICANT CHANGE UP (ref 0–0.2)
BASOPHILS NFR BLD AUTO: 0.5 % — SIGNIFICANT CHANGE UP (ref 0–2)
BILIRUB SERPL-MCNC: 1 MG/DL — SIGNIFICANT CHANGE UP (ref 0.2–1.2)
BILIRUB UR-MCNC: NEGATIVE — SIGNIFICANT CHANGE UP
BUN SERPL-MCNC: 9 MG/DL — SIGNIFICANT CHANGE UP (ref 7–23)
CALCIUM SERPL-MCNC: 8.7 MG/DL — SIGNIFICANT CHANGE UP (ref 8.4–10.5)
CHLORIDE SERPL-SCNC: 102 MMOL/L — SIGNIFICANT CHANGE UP (ref 96–108)
CO2 SERPL-SCNC: 25 MMOL/L — SIGNIFICANT CHANGE UP (ref 22–31)
COLOR SPEC: YELLOW — SIGNIFICANT CHANGE UP
COMMENT - URINE: SIGNIFICANT CHANGE UP
CREAT SERPL-MCNC: 0.98 MG/DL — SIGNIFICANT CHANGE UP (ref 0.5–1.3)
DIFF PNL FLD: NEGATIVE — SIGNIFICANT CHANGE UP
EGFR: 80 ML/MIN/1.73M2 — SIGNIFICANT CHANGE UP
EOSINOPHIL # BLD AUTO: 0.22 K/UL — SIGNIFICANT CHANGE UP (ref 0–0.5)
EOSINOPHIL NFR BLD AUTO: 2.6 % — SIGNIFICANT CHANGE UP (ref 0–6)
EPI CELLS # UR: SIGNIFICANT CHANGE UP
GLUCOSE SERPL-MCNC: 120 MG/DL — HIGH (ref 70–99)
GLUCOSE UR QL: NEGATIVE — SIGNIFICANT CHANGE UP
HCT VFR BLD CALC: 46 % — SIGNIFICANT CHANGE UP (ref 39–50)
HGB BLD-MCNC: 14.2 G/DL — SIGNIFICANT CHANGE UP (ref 13–17)
HMPV RNA SPEC QL NAA+PROBE: DETECTED
IMM GRANULOCYTES NFR BLD AUTO: 0.5 % — SIGNIFICANT CHANGE UP (ref 0–0.9)
INR BLD: 1.55 RATIO — HIGH (ref 0.88–1.16)
KETONES UR-MCNC: NEGATIVE — SIGNIFICANT CHANGE UP
LACTATE SERPL-SCNC: 1.8 MMOL/L — SIGNIFICANT CHANGE UP (ref 0.7–2)
LEUKOCYTE ESTERASE UR-ACNC: NEGATIVE — SIGNIFICANT CHANGE UP
LYMPHOCYTES # BLD AUTO: 1.25 K/UL — SIGNIFICANT CHANGE UP (ref 1–3.3)
LYMPHOCYTES # BLD AUTO: 14.7 % — SIGNIFICANT CHANGE UP (ref 13–44)
MCHC RBC-ENTMCNC: 25.8 PG — LOW (ref 27–34)
MCHC RBC-ENTMCNC: 30.9 GM/DL — LOW (ref 32–36)
MCV RBC AUTO: 83.6 FL — SIGNIFICANT CHANGE UP (ref 80–100)
MONOCYTES # BLD AUTO: 0.95 K/UL — HIGH (ref 0–0.9)
MONOCYTES NFR BLD AUTO: 11.2 % — SIGNIFICANT CHANGE UP (ref 2–14)
NEUTROPHILS # BLD AUTO: 6 K/UL — SIGNIFICANT CHANGE UP (ref 1.8–7.4)
NEUTROPHILS NFR BLD AUTO: 70.5 % — SIGNIFICANT CHANGE UP (ref 43–77)
NITRITE UR-MCNC: NEGATIVE — SIGNIFICANT CHANGE UP
NRBC # BLD: 0 /100 WBCS — SIGNIFICANT CHANGE UP (ref 0–0)
NT-PROBNP SERPL-SCNC: 935 PG/ML — HIGH (ref 0–300)
PH UR: 6 — SIGNIFICANT CHANGE UP (ref 5–8)
PLATELET # BLD AUTO: 200 K/UL — SIGNIFICANT CHANGE UP (ref 150–400)
POTASSIUM SERPL-MCNC: 3.9 MMOL/L — SIGNIFICANT CHANGE UP (ref 3.5–5.3)
POTASSIUM SERPL-SCNC: 3.9 MMOL/L — SIGNIFICANT CHANGE UP (ref 3.5–5.3)
PROT SERPL-MCNC: 7.4 G/DL — SIGNIFICANT CHANGE UP (ref 6–8.3)
PROT UR-MCNC: 30 MG/DL
PROTHROM AB SERPL-ACNC: 18.1 SEC — HIGH (ref 10.5–13.4)
RAPID RVP RESULT: DETECTED
RBC # BLD: 5.5 M/UL — SIGNIFICANT CHANGE UP (ref 4.2–5.8)
RBC # FLD: 17.6 % — HIGH (ref 10.3–14.5)
RBC CASTS # UR COMP ASSIST: NEGATIVE /HPF — SIGNIFICANT CHANGE UP (ref 0–4)
SARS-COV-2 RNA SPEC QL NAA+PROBE: SIGNIFICANT CHANGE UP
SODIUM SERPL-SCNC: 139 MMOL/L — SIGNIFICANT CHANGE UP (ref 135–145)
SP GR SPEC: 1.01 — SIGNIFICANT CHANGE UP (ref 1.01–1.02)
TROPONIN I, HIGH SENSITIVITY RESULT: 17 NG/L — SIGNIFICANT CHANGE UP
UROBILINOGEN FLD QL: NEGATIVE — SIGNIFICANT CHANGE UP
WBC # BLD: 8.5 K/UL — SIGNIFICANT CHANGE UP (ref 3.8–10.5)
WBC # FLD AUTO: 8.5 K/UL — SIGNIFICANT CHANGE UP (ref 3.8–10.5)
WBC UR QL: SIGNIFICANT CHANGE UP /HPF (ref 0–5)

## 2023-03-12 PROCEDURE — 93010 ELECTROCARDIOGRAM REPORT: CPT

## 2023-03-12 PROCEDURE — 99223 1ST HOSP IP/OBS HIGH 75: CPT

## 2023-03-12 PROCEDURE — 71045 X-RAY EXAM CHEST 1 VIEW: CPT | Mod: 26

## 2023-03-12 PROCEDURE — 99285 EMERGENCY DEPT VISIT HI MDM: CPT | Mod: CS

## 2023-03-12 PROCEDURE — 93306 TTE W/DOPPLER COMPLETE: CPT | Mod: 26

## 2023-03-12 RX ORDER — RIVAROXABAN 15 MG-20MG
20 KIT ORAL
Refills: 0 | Status: DISCONTINUED | OUTPATIENT
Start: 2023-03-12 | End: 2023-03-12

## 2023-03-12 RX ORDER — DILTIAZEM HCL 120 MG
240 CAPSULE, EXT RELEASE 24 HR ORAL DAILY
Refills: 0 | Status: DISCONTINUED | OUTPATIENT
Start: 2023-03-12 | End: 2023-03-15

## 2023-03-12 RX ORDER — AZITHROMYCIN 500 MG/1
500 TABLET, FILM COATED ORAL ONCE
Refills: 0 | Status: COMPLETED | OUTPATIENT
Start: 2023-03-12 | End: 2023-03-12

## 2023-03-12 RX ORDER — CEFTRIAXONE 500 MG/1
1000 INJECTION, POWDER, FOR SOLUTION INTRAMUSCULAR; INTRAVENOUS ONCE
Refills: 0 | Status: COMPLETED | OUTPATIENT
Start: 2023-03-12 | End: 2023-03-12

## 2023-03-12 RX ORDER — POLYETHYLENE GLYCOL 3350 17 G/17G
17 POWDER, FOR SOLUTION ORAL DAILY
Refills: 0 | Status: DISCONTINUED | OUTPATIENT
Start: 2023-03-12 | End: 2023-03-15

## 2023-03-12 RX ORDER — PHENYLEPHRINE-SHARK LIVER OIL-MINERAL OIL-PETROLATUM RECTAL OINTMENT
4 OINTMENT (GRAM) RECTAL
Refills: 0 | Status: DISCONTINUED | OUTPATIENT
Start: 2023-03-12 | End: 2023-03-15

## 2023-03-12 RX ORDER — CHOLECALCIFEROL (VITAMIN D3) 125 MCG
1 CAPSULE ORAL
Qty: 0 | Refills: 0 | DISCHARGE

## 2023-03-12 RX ORDER — BUDESONIDE AND FORMOTEROL FUMARATE DIHYDRATE 160; 4.5 UG/1; UG/1
2 AEROSOL RESPIRATORY (INHALATION)
Refills: 0 | Status: DISCONTINUED | OUTPATIENT
Start: 2023-03-12 | End: 2023-03-15

## 2023-03-12 RX ORDER — RIVAROXABAN 15 MG-20MG
0 KIT ORAL
Qty: 0 | Refills: 0 | DISCHARGE

## 2023-03-12 RX ORDER — ASPIRIN/CALCIUM CARB/MAGNESIUM 324 MG
81 TABLET ORAL DAILY
Refills: 0 | Status: DISCONTINUED | OUTPATIENT
Start: 2023-03-12 | End: 2023-03-15

## 2023-03-12 RX ORDER — INFLUENZA VIRUS VACCINE 15; 15; 15; 15 UG/.5ML; UG/.5ML; UG/.5ML; UG/.5ML
0.7 SUSPENSION INTRAMUSCULAR ONCE
Refills: 0 | Status: DISCONTINUED | OUTPATIENT
Start: 2023-03-12 | End: 2023-03-15

## 2023-03-12 RX ORDER — RIVAROXABAN 15 MG-20MG
20 KIT ORAL
Refills: 0 | Status: DISCONTINUED | OUTPATIENT
Start: 2023-03-12 | End: 2023-03-15

## 2023-03-12 RX ORDER — ALBUTEROL 90 UG/1
2 AEROSOL, METERED ORAL EVERY 6 HOURS
Refills: 0 | Status: DISCONTINUED | OUTPATIENT
Start: 2023-03-15 | End: 2023-03-15

## 2023-03-12 RX ORDER — MONTELUKAST 4 MG/1
10 TABLET, CHEWABLE ORAL AT BEDTIME
Refills: 0 | Status: DISCONTINUED | OUTPATIENT
Start: 2023-03-12 | End: 2023-03-15

## 2023-03-12 RX ORDER — IPRATROPIUM/ALBUTEROL SULFATE 18-103MCG
3 AEROSOL WITH ADAPTER (GRAM) INHALATION ONCE
Refills: 0 | Status: COMPLETED | OUTPATIENT
Start: 2023-03-12 | End: 2023-03-12

## 2023-03-12 RX ORDER — ONDANSETRON 8 MG/1
4 TABLET, FILM COATED ORAL EVERY 8 HOURS
Refills: 0 | Status: DISCONTINUED | OUTPATIENT
Start: 2023-03-12 | End: 2023-03-15

## 2023-03-12 RX ORDER — CHOLECALCIFEROL (VITAMIN D3) 125 MCG
1000 CAPSULE ORAL DAILY
Refills: 0 | Status: DISCONTINUED | OUTPATIENT
Start: 2023-03-12 | End: 2023-03-15

## 2023-03-12 RX ORDER — SODIUM CHLORIDE 9 MG/ML
1000 INJECTION INTRAMUSCULAR; INTRAVENOUS; SUBCUTANEOUS ONCE
Refills: 0 | Status: COMPLETED | OUTPATIENT
Start: 2023-03-12 | End: 2023-03-12

## 2023-03-12 RX ORDER — GUAIFENESIN/DEXTROMETHORPHAN 600MG-30MG
10 TABLET, EXTENDED RELEASE 12 HR ORAL EVERY 4 HOURS
Refills: 0 | Status: DISCONTINUED | OUTPATIENT
Start: 2023-03-12 | End: 2023-03-15

## 2023-03-12 RX ORDER — LOSARTAN POTASSIUM 100 MG/1
1 TABLET, FILM COATED ORAL
Qty: 0 | Refills: 0 | DISCHARGE

## 2023-03-12 RX ORDER — ALBUTEROL 90 UG/1
2 AEROSOL, METERED ORAL
Qty: 0 | Refills: 0 | DISCHARGE

## 2023-03-12 RX ORDER — CEFTRIAXONE 500 MG/1
1000 INJECTION, POWDER, FOR SOLUTION INTRAMUSCULAR; INTRAVENOUS EVERY 24 HOURS
Refills: 0 | Status: DISCONTINUED | OUTPATIENT
Start: 2023-03-13 | End: 2023-03-13

## 2023-03-12 RX ORDER — IPRATROPIUM/ALBUTEROL SULFATE 18-103MCG
3 AEROSOL WITH ADAPTER (GRAM) INHALATION EVERY 6 HOURS
Refills: 0 | Status: COMPLETED | OUTPATIENT
Start: 2023-03-12 | End: 2023-03-15

## 2023-03-12 RX ORDER — TRAMADOL HYDROCHLORIDE 50 MG/1
50 TABLET ORAL EVERY 8 HOURS
Refills: 0 | Status: DISCONTINUED | OUTPATIENT
Start: 2023-03-12 | End: 2023-03-15

## 2023-03-12 RX ORDER — GABAPENTIN 400 MG/1
0 CAPSULE ORAL
Qty: 0 | Refills: 0 | DISCHARGE

## 2023-03-12 RX ORDER — AZITHROMYCIN 500 MG/1
500 TABLET, FILM COATED ORAL EVERY 24 HOURS
Refills: 0 | Status: DISCONTINUED | OUTPATIENT
Start: 2023-03-13 | End: 2023-03-13

## 2023-03-12 RX ORDER — APIXABAN 2.5 MG/1
0 TABLET, FILM COATED ORAL
Qty: 0 | Refills: 0 | DISCHARGE

## 2023-03-12 RX ORDER — LOSARTAN POTASSIUM 100 MG/1
25 TABLET, FILM COATED ORAL DAILY
Refills: 0 | Status: DISCONTINUED | OUTPATIENT
Start: 2023-03-12 | End: 2023-03-15

## 2023-03-12 RX ORDER — ACETAMINOPHEN 500 MG
975 TABLET ORAL ONCE
Refills: 0 | Status: COMPLETED | OUTPATIENT
Start: 2023-03-12 | End: 2023-03-12

## 2023-03-12 RX ORDER — ACETAMINOPHEN 500 MG
650 TABLET ORAL EVERY 6 HOURS
Refills: 0 | Status: DISCONTINUED | OUTPATIENT
Start: 2023-03-12 | End: 2023-03-15

## 2023-03-12 RX ADMIN — Medication 240 MILLIGRAM(S): at 11:14

## 2023-03-12 RX ADMIN — Medication 81 MILLIGRAM(S): at 11:15

## 2023-03-12 RX ADMIN — Medication 125 MILLIGRAM(S): at 09:26

## 2023-03-12 RX ADMIN — RIVAROXABAN 20 MILLIGRAM(S): KIT at 12:09

## 2023-03-12 RX ADMIN — Medication 3 MILLILITER(S): at 21:32

## 2023-03-12 RX ADMIN — CEFTRIAXONE 100 MILLIGRAM(S): 500 INJECTION, POWDER, FOR SOLUTION INTRAMUSCULAR; INTRAVENOUS at 08:39

## 2023-03-12 RX ADMIN — POLYETHYLENE GLYCOL 3350 17 GRAM(S): 17 POWDER, FOR SOLUTION ORAL at 21:30

## 2023-03-12 RX ADMIN — MONTELUKAST 10 MILLIGRAM(S): 4 TABLET, CHEWABLE ORAL at 21:29

## 2023-03-12 RX ADMIN — SODIUM CHLORIDE 1000 MILLILITER(S): 9 INJECTION INTRAMUSCULAR; INTRAVENOUS; SUBCUTANEOUS at 09:00

## 2023-03-12 RX ADMIN — Medication 1000 UNIT(S): at 11:14

## 2023-03-12 RX ADMIN — BUDESONIDE AND FORMOTEROL FUMARATE DIHYDRATE 2 PUFF(S): 160; 4.5 AEROSOL RESPIRATORY (INHALATION) at 21:28

## 2023-03-12 RX ADMIN — Medication 975 MILLIGRAM(S): at 08:38

## 2023-03-12 RX ADMIN — LOSARTAN POTASSIUM 25 MILLIGRAM(S): 100 TABLET, FILM COATED ORAL at 11:15

## 2023-03-12 RX ADMIN — Medication 3 MILLILITER(S): at 15:26

## 2023-03-12 RX ADMIN — AZITHROMYCIN 255 MILLIGRAM(S): 500 TABLET, FILM COATED ORAL at 08:39

## 2023-03-12 RX ADMIN — Medication 3 MILLILITER(S): at 08:40

## 2023-03-12 RX ADMIN — AZITHROMYCIN 500 MILLIGRAM(S): 500 TABLET, FILM COATED ORAL at 10:02

## 2023-03-12 RX ADMIN — Medication 975 MILLIGRAM(S): at 10:00

## 2023-03-12 NOTE — GOALS OF CARE CONVERSATION - ADVANCED CARE PLANNING - CONVERSATION DETAILS
Discussed GOC. Filled out MOLST at bedside. Prefer DNR/DNI. No feeding tube.   Prefer a comfort type approach, Although agree to have ABX, blood draws and IVF if necessary.

## 2023-03-12 NOTE — H&P ADULT - HISTORY OF PRESENT ILLNESS
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 As per patient's wife, patient has been having continuous coughing for the past 1 week. On Friday, the cough was significantly worse. Last night, patient was not able to use CPAP due to the cough and did not sleep. His wife made him come to the ED. Patient denies sick contacts. He reports cough, sob, wheezing, and diaphoresis. He denies other complaints.    In the ED, temp 100.1, HR 94, /82, RR 22, O2 sat 96% on ra. CXR shows possible RML infiltrate. Lactate was neg. Patient admitted for sepsis due to pneumonia.

## 2023-03-12 NOTE — ED PROVIDER NOTE - PHYSICAL EXAMINATION
[id number]  perrl, eomi  neck supple  scattered rhonchi b/l, tachypneic  able to speak in complete sentences  irregularly irregulat  abd soft, non tender  no focal neuro deficits  normal gait  no rash  no pedal edema  no calf tenderness

## 2023-03-12 NOTE — H&P ADULT - NSHPPHYSICALEXAM_GEN_ALL_CORE
T(C): 39.3 (03-12-23 @ 08:20), Max: 39.3 (03-12-23 @ 08:20)  HR: 101 (03-12-23 @ 08:20) (94 - 101)  BP: 148/82 (03-12-23 @ 08:06) (148/82 - 148/82)  RR: 22 (03-12-23 @ 08:20) (22 - 22)  SpO2: 96% (03-12-23 @ 08:20) (96% - 96%)  Wt(kg): --Vital Signs Last 24 Hrs  T(C): 39.3 (12 Mar 2023 08:20), Max: 39.3 (12 Mar 2023 08:20)  T(F): 102.7 (12 Mar 2023 08:20), Max: 102.7 (12 Mar 2023 08:20)  HR: 101 (12 Mar 2023 08:20) (94 - 101)  BP: 148/82 (12 Mar 2023 08:06) (148/82 - 148/82)  BP(mean): --  RR: 22 (12 Mar 2023 08:20) (22 - 22)  SpO2: 96% (12 Mar 2023 08:20) (96% - 96%)    Parameters below as of 12 Mar 2023 08:20  Patient On (Oxygen Delivery Method): room air        PHYSICAL EXAM:  GENERAL: diaphoretic   HENT:  Atraumatic, Normocephalic; No tonsillar erythema, exudates, or enlargement; Moist mucous membranes;   EYES: EOMI, PERRLA, conjunctiva and sclera clear, no lid-lag  NECK: Supple, No JVD, Normal thyroid  NERVOUS SYSTEM:  CN II - XII intact; Sensation intact; Motor Strength 5/5 B/L upper and lower extremities  CHEST/LUNG: Clear to percussion bilaterally; +bibasilar wheezing and rhonchi; normal respiratory effort, no intercostal retractions; Trace pitting edema  HEART: Regular rate, irrgeular rhythm; No murmurs, rubs, or gallops  ABDOMEN: Soft, Nontender, obese abdomen; Bowel sounds present; No HSM  MUSCULOSKELETAL/EXTREMITIES:  2+ Peripheral Pulses, No clubbing, or digital cyanosis  SKIN: No rashes or lesions; normal texture and temperature  PSYCH: Appropriate affect, Alert & Oriented x 3

## 2023-03-12 NOTE — ED PROVIDER NOTE - OBJECTIVE STATEMENT
71 yo male with hx htn, a fib on xarelto, copd, aortic aneurysm c/o 1 week of cough, with sob since yesterday. subjective fever at home. worsening dyspnea with exertion and laying flat  denies any chest pain  denies any abdominal pain  Denies any leg pain or leg swelling  Denies any sick contacts

## 2023-03-12 NOTE — H&P ADULT - NSHPLABSRESULTS_GEN_ALL_CORE
LABS:                        14.2   8.50  )-----------( 200      ( 12 Mar 2023 08:20 )             46.0     03-12    139  |  102  |  9   ----------------------------<  120<H>  3.9   |  25  |  0.98    Ca    8.7      12 Mar 2023 08:50    TPro  7.4  /  Alb  3.4  /  TBili  1.0  /  DBili  x   /  AST  28  /  ALT  25  /  AlkPhos  75  03-12    PT/INR - ( 12 Mar 2023 08:20 )   PT: 18.1 sec;   INR: 1.55 ratio         PTT - ( 12 Mar 2023 08:20 )  PTT:37.9 sec    RADIOLOGY & ADDITIONAL TESTS:  CXR (3/12/23) Possible right middle lobe infiltrate. Cardiomegaly. Follow official report  EKG (3/12/23) a. fib, 94bpm, qtc 475    Care Discussed with Consultants/Other Providers [ x] YES  [ ] NO  Imaging Personally Reviewed:  [x ] YES  [ ] NO

## 2023-03-12 NOTE — PATIENT PROFILE ADULT - FALL HARM RISK - HARM RISK INTERVENTIONS

## 2023-03-12 NOTE — ED ADULT NURSE NOTE - OBJECTIVE STATEMENT
Pt reporting shortness of breath for several days and fever that started last night. Skin warm dry color pink, respirations labored. Moist cough.

## 2023-03-12 NOTE — H&P ADULT - NSHPREVIEWOFSYSTEMS_GEN_ALL_CORE
CONSTITUTIONAL: +diaphoresis, No fever, weight loss, or fatigue  EYES: No eye pain, visual disturbances, or discharge  ENMT:  No difficulty hearing, tinnitus, vertigo; No sinus or throat pain  NECK: No pain or stiffness  RESPIRATORY: +cough, wheezing, and shortness of breath  CARDIOVASCULAR: No chest pain, palpitations, dizziness, or leg swelling  GASTROINTESTINAL: No abdominal or epigastric pain. No nausea, vomiting, or hematemesis; No diarrhea or constipation. No melena or hematochezia.  GENITOURINARY: No dysuria, frequency, hematuria, or incontinence  NEUROLOGICAL: No headaches, memory loss, loss of strength, numbness, or tremors  SKIN: No itching, burning, rashes, or lesions   MUSCULOSKELETAL: No joint pain or swelling; No muscle, back, or extremity pain  PSYCHIATRIC: No depression, anxiety, mood swings, or difficulty sleeping  ALLERGY AND IMMUNOLOGIC: No hives or eczema    ALL ROS REVIEWED AND NORMAL EXCEPT AS STATED ABOVE

## 2023-03-12 NOTE — H&P ADULT - ASSESSMENT
71 yo man with history of Chronic Atrial Fibrillation, COPD, BARRON on CPAP, Low Back Pain, Hypertension, and ascending aortic hemiarch repair with graft via right thoracotomy / YORDY on 2/8/2018 comes to ED for cough.    Sepsis likely due to RML Pneumonia and Human Metapneumovirus (hMPV)  - Tmax 102.7, HR 94, RR 22  - No leukocytosis and lactate WNL  - Given ceftriaxone IV, azithromycin IV, and 2L NS in the ED   - Continue Rocephin 1gm IV and Zithromax 500mg IV for 4 more days  - Droplet precaution for hMPV  - F/U BCx, UA, UCx, legionella, strep antigen, and procal  - Consider discontinuing antibiotics if procal is negative  - Albuterol/Atrovent 1 unit neb Q6hrs ATC x 3 days then albuterol HFA PRN.  - Follow up official CXR report  - Robitussin-DM PRN for cough.    COPD  - mild exacerbation but no need to steroids or supplemental O2.  - Duonebs ATC for 3 days  - Continue symbicort in place of advair  - Continue montelukast    Chronic Atrial Fibrillation  - EKG shows a. fib but rate controlled  - Continue xarelto and cardizem    History of Ascending aortic hemiarch repair  - F/U TTE    Hypertension  - Continue losartan and cardizem  - Monitor vital signs while septic     DVT Prophylaxis with xarelto    Wife Julius [phone]  h [phone]    Nubia was at bedside and aware of the plan. Discussed MOLST form and reviewed with the patient and wife. They will return the form once filled. he is full code for now   71 yo man with history of Chronic Atrial Fibrillation, COPD, BARRON on CPAP, Low Back Pain, Hypertension, and ascending aortic hemiarch repair with graft via right thoracotomy / YORDY on 2/8/2018 comes to ED for cough.    Sepsis likely due to RML Pneumonia and/or Human Metapneumovirus (hMPV)  - Tmax 102.7, HR 94, RR 22  - No leukocytosis and lactate WNL  - Given ceftriaxone IV, azithromycin IV, and 2L NS in the ED   - Continue Rocephin 1gm IV and Zithromax 500mg IV for 4 more days  - Contact precaution for hMPV  - F/U BCx, UA, UCx, legionella, strep antigen, and procal  - Consider discontinuing antibiotics if procal is negative  - Albuterol/Atrovent 1 unit neb Q6hrs ATC x 3 days then albuterol HFA PRN.  - Follow up official CXR report  - Robitussin-DM PRN for cough.    COPD  - mild exacerbation but no need to steroids or supplemental O2.  - Duonebs ATC for 3 days  - Continue symbicort in place of advair  - Continue montelukast  - Close monitoring of respiratory status    Chronic Atrial Fibrillation  - EKG shows a. fib but rate controlled  - Continue xarelto and cardizem    History of Ascending aortic hemiarch repair  - F/U TTE    Hypertension  - Continue losartan and cardizem  - Monitor vital signs while septic     DVT Prophylaxis with xarelto    Wife Julius [phone]  h [phone]    Nubia was at bedside and aware of the plan. Discussed MOLST form and reviewed with the patient and wife. They will return the form once filled. he is full code for now   73 yo man with history of Chronic Atrial Fibrillation, COPD, BARRON on CPAP, Low Back Pain, Hypertension, and ascending aortic hemiarch repair with graft via right thoracotomy / YORDY on 2/8/2018 comes to ED for cough.    Sepsis likely due to RML Pneumonia and/or Human Metapneumovirus (hMPV)  - Tmax 102.7, HR 94, RR 22  - No leukocytosis and lactate WNL  - Given ceftriaxone IV, azithromycin IV, and 2L NS in the ED   - Continue Rocephin 1gm IV and Zithromax 500mg IV for 4 more days  - Contact precaution for hMPV  - F/U BCx, UA, UCx, legionella, strep antigen, and procal  - Consider discontinuing antibiotics if procal is negative  - Albuterol/Atrovent 1 unit neb Q6hrs ATC x 3 days then albuterol HFA PRN.  - Follow up official CXR report  - Robitussin-DM PRN for cough.    COPD  - BARRON on CPAP  - mild exacerbation but no need to steroids or supplemental O2.  - Duonebs ATC for 3 days  - Continue symbicort in place of advair  - Continue montelukast  - Close monitoring of respiratory status    Chronic Atrial Fibrillation  - EKG shows a. fib but rate controlled  - Continue xarelto and cardizem    History of Ascending aortic hemiarch repair  - F/U TTE    Hypertension  - Continue losartan and cardizem  - Monitor vital signs while septic       DVT Prophylaxis with xarelto    Wife Julius [phone]  h [phone]    Nubia was at bedside and aware of the plan. Discussed MOLST form and reviewed with the patient and wife. They will return the form once filled. he is full code for now   73 yo man with history of Chronic Atrial Fibrillation, COPD, BARRON on CPAP, Low Back Pain, Hypertension, and ascending aortic hemiarch repair with graft via right thoracotomy / YORDY on 2/8/2018 comes to ED for cough.    Sepsis likely due to RML Pneumonia and/or Human Metapneumovirus (hMPV)  - Tmax 102.7, HR 94, RR 22  - No leukocytosis and lactate WNL  - Given ceftriaxone IV, azithromycin IV, and 2L NS in the ED   - Continue Rocephin 1gm IV and Zithromax 500mg IV for 4 more days  - Contact precaution for hMPV  - F/U BCx, UA, UCx, legionella, strep antigen, and procal  - Consider discontinuing antibiotics if procal is negative  - Albuterol/Atrovent 1 unit neb Q6hrs ATC x 3 days then albuterol HFA PRN.  - Follow up official CXR report  - Robitussin-DM PRN for cough.    COPD  - BARRON on CPAP  - mild exacerbation but no need to steroids or supplemental O2.  - Duonebs ATC for 3 days  - Continue symbicort in place of advair  - Continue montelukast  - Close monitoring of respiratory status    Chronic Atrial Fibrillation  - EKG shows a. fib but rate controlled  - Continue xarelto and cardizem    History of Ascending aortic hemiarch repair  - F/U TTE    Hypertension  - Continue losartan and cardizem  - Monitor vital signs while septic       DVT Prophylaxis with xarelto    Wife Julius [phone]  h [phone]    Nubia was at bedside and aware of the plan. Discussed MOLST form and reviewed with the patient and wife. They will return the form once filled. They have not had this conversation before. He is full code for now. Please follow up

## 2023-03-13 LAB
ALBUMIN SERPL ELPH-MCNC: 3.1 G/DL — LOW (ref 3.3–5)
ALP SERPL-CCNC: 65 U/L — SIGNIFICANT CHANGE UP (ref 40–120)
ALT FLD-CCNC: 20 U/L — SIGNIFICANT CHANGE UP (ref 10–45)
ANION GAP SERPL CALC-SCNC: 11 MMOL/L — SIGNIFICANT CHANGE UP (ref 5–17)
AST SERPL-CCNC: 25 U/L — SIGNIFICANT CHANGE UP (ref 10–40)
BASOPHILS # BLD AUTO: 0.02 K/UL — SIGNIFICANT CHANGE UP (ref 0–0.2)
BASOPHILS NFR BLD AUTO: 0.3 % — SIGNIFICANT CHANGE UP (ref 0–2)
BILIRUB SERPL-MCNC: 0.6 MG/DL — SIGNIFICANT CHANGE UP (ref 0.2–1.2)
BUN SERPL-MCNC: 15 MG/DL — SIGNIFICANT CHANGE UP (ref 7–23)
CALCIUM SERPL-MCNC: 9 MG/DL — SIGNIFICANT CHANGE UP (ref 8.4–10.5)
CHLORIDE SERPL-SCNC: 106 MMOL/L — SIGNIFICANT CHANGE UP (ref 96–108)
CO2 SERPL-SCNC: 23 MMOL/L — SIGNIFICANT CHANGE UP (ref 22–31)
CREAT SERPL-MCNC: 0.9 MG/DL — SIGNIFICANT CHANGE UP (ref 0.5–1.3)
CULTURE RESULTS: SIGNIFICANT CHANGE UP
EGFR: 90 ML/MIN/1.73M2 — SIGNIFICANT CHANGE UP
EOSINOPHIL # BLD AUTO: 0 K/UL — SIGNIFICANT CHANGE UP (ref 0–0.5)
EOSINOPHIL NFR BLD AUTO: 0 % — SIGNIFICANT CHANGE UP (ref 0–6)
GLUCOSE SERPL-MCNC: 161 MG/DL — HIGH (ref 70–99)
HCT VFR BLD CALC: 47 % — SIGNIFICANT CHANGE UP (ref 39–50)
HCV AB S/CO SERPL IA: 0.07 S/CO — SIGNIFICANT CHANGE UP (ref 0–0.99)
HCV AB SERPL-IMP: SIGNIFICANT CHANGE UP
HGB BLD-MCNC: 14.4 G/DL — SIGNIFICANT CHANGE UP (ref 13–17)
IMM GRANULOCYTES NFR BLD AUTO: 0.8 % — SIGNIFICANT CHANGE UP (ref 0–0.9)
LYMPHOCYTES # BLD AUTO: 0.8 K/UL — LOW (ref 1–3.3)
LYMPHOCYTES # BLD AUTO: 10 % — LOW (ref 13–44)
MAGNESIUM SERPL-MCNC: 1.8 MG/DL — SIGNIFICANT CHANGE UP (ref 1.6–2.6)
MCHC RBC-ENTMCNC: 26 PG — LOW (ref 27–34)
MCHC RBC-ENTMCNC: 30.6 GM/DL — LOW (ref 32–36)
MCV RBC AUTO: 85 FL — SIGNIFICANT CHANGE UP (ref 80–100)
MONOCYTES # BLD AUTO: 0.55 K/UL — SIGNIFICANT CHANGE UP (ref 0–0.9)
MONOCYTES NFR BLD AUTO: 6.9 % — SIGNIFICANT CHANGE UP (ref 2–14)
NEUTROPHILS # BLD AUTO: 6.57 K/UL — SIGNIFICANT CHANGE UP (ref 1.8–7.4)
NEUTROPHILS NFR BLD AUTO: 82 % — HIGH (ref 43–77)
NRBC # BLD: 0 /100 WBCS — SIGNIFICANT CHANGE UP (ref 0–0)
PHOSPHATE SERPL-MCNC: 3.8 MG/DL — SIGNIFICANT CHANGE UP (ref 2.5–4.5)
PLATELET # BLD AUTO: 176 K/UL — SIGNIFICANT CHANGE UP (ref 150–400)
POTASSIUM SERPL-MCNC: 4.2 MMOL/L — SIGNIFICANT CHANGE UP (ref 3.5–5.3)
POTASSIUM SERPL-SCNC: 4.2 MMOL/L — SIGNIFICANT CHANGE UP (ref 3.5–5.3)
PROCALCITONIN SERPL-MCNC: 0.04 NG/ML — SIGNIFICANT CHANGE UP
PROT SERPL-MCNC: 7.3 G/DL — SIGNIFICANT CHANGE UP (ref 6–8.3)
RBC # BLD: 5.53 M/UL — SIGNIFICANT CHANGE UP (ref 4.2–5.8)
RBC # FLD: 17.6 % — HIGH (ref 10.3–14.5)
SODIUM SERPL-SCNC: 140 MMOL/L — SIGNIFICANT CHANGE UP (ref 135–145)
SPECIMEN SOURCE: SIGNIFICANT CHANGE UP
WBC # BLD: 8 K/UL — SIGNIFICANT CHANGE UP (ref 3.8–10.5)
WBC # FLD AUTO: 8 K/UL — SIGNIFICANT CHANGE UP (ref 3.8–10.5)

## 2023-03-13 PROCEDURE — 99232 SBSQ HOSP IP/OBS MODERATE 35: CPT | Mod: FS

## 2023-03-13 RX ORDER — DIPHENHYDRAMINE HCL 50 MG
25 CAPSULE ORAL EVERY 4 HOURS
Refills: 0 | Status: DISCONTINUED | OUTPATIENT
Start: 2023-03-13 | End: 2023-03-15

## 2023-03-13 RX ADMIN — Medication 240 MILLIGRAM(S): at 06:05

## 2023-03-13 RX ADMIN — Medication 81 MILLIGRAM(S): at 12:39

## 2023-03-13 RX ADMIN — Medication 3 MILLILITER(S): at 15:32

## 2023-03-13 RX ADMIN — LOSARTAN POTASSIUM 25 MILLIGRAM(S): 100 TABLET, FILM COATED ORAL at 06:05

## 2023-03-13 RX ADMIN — BUDESONIDE AND FORMOTEROL FUMARATE DIHYDRATE 2 PUFF(S): 160; 4.5 AEROSOL RESPIRATORY (INHALATION) at 09:11

## 2023-03-13 RX ADMIN — Medication 10 MILLILITER(S): at 14:30

## 2023-03-13 RX ADMIN — Medication 3 MILLILITER(S): at 09:11

## 2023-03-13 RX ADMIN — Medication 1000 UNIT(S): at 12:39

## 2023-03-13 RX ADMIN — Medication 3 MILLILITER(S): at 04:29

## 2023-03-13 RX ADMIN — RIVAROXABAN 20 MILLIGRAM(S): KIT at 08:28

## 2023-03-13 RX ADMIN — POLYETHYLENE GLYCOL 3350 17 GRAM(S): 17 POWDER, FOR SOLUTION ORAL at 17:31

## 2023-03-13 RX ADMIN — Medication 25 MILLIGRAM(S): at 17:30

## 2023-03-13 RX ADMIN — MONTELUKAST 10 MILLIGRAM(S): 4 TABLET, CHEWABLE ORAL at 22:28

## 2023-03-13 RX ADMIN — Medication 3 MILLILITER(S): at 21:23

## 2023-03-13 RX ADMIN — BUDESONIDE AND FORMOTEROL FUMARATE DIHYDRATE 2 PUFF(S): 160; 4.5 AEROSOL RESPIRATORY (INHALATION) at 21:24

## 2023-03-13 NOTE — CHART NOTE - NSCHARTNOTEFT_GEN_A_CORE
Patient c/o of feeling flush, itchiness to the face and tearing eyes. Denies further complaints including SOB or itchy throat.   Patient noted to be mildly red to the face. No hives.   Did not receive antibiotics today. Unsure whether possibly allergic to Ceftriaxone or Azithromycin. Will DC both. Pro shelbie neg. No leukocytosis. Consider Levaquin or doxy if patient decompensates.

## 2023-03-13 NOTE — PROGRESS NOTE ADULT - ASSESSMENT
73 yo man with history of Chronic Atrial Fibrillation, COPD, BARRON on CPAP, Low Back Pain, Hypertension, and ascending aortic hemiarch repair with graft via right thoracotomy / YORDY on 2/8/2018 comes to ED for cough.    Sepsis likely due to RML Pneumonia CAP and/or Human Metapneumovirus (hMPV)  - Tmax 102.7, HR 94, RR 22  -Chest x ray concerning for PNA  - Cont ceftriaxone IV, azithromycin IV    - Contact precaution for hMPV  - F/U BCx, UA, UCx, legionella, strep antigen, and procal  - Albuterol/Atrovent 1 unit neb Q6hrs ATC x 3 days then albuterol HFA PRN.  - Robitussin-DM PRN for cough.    COPD  - BARRON on CPAP  - mild exacerbation but no need to steroids or supplemental O2.  - Duonebs ATC for 3 days  - Continue symbicort in place of advair  - Continue montelukast  - Close monitoring of respiratory status    Chronic Atrial Fibrillation  - EKG shows a. fib but rate controlled  - Continue xarelto and cardizem    History of Ascending aortic hemiarch repair  - F/U TTE    Hypertension  - Continue losartan and cardizem  - Monitor vital signs while septic       DVT Prophylaxis with xarelto    GOC: DNR/DNI    Wife Julius [phone]  h [phone]    Will update family at bedside.     Dispo: Home pending clinical course. Likely +/- 48 hrs

## 2023-03-13 NOTE — PROGRESS NOTE ADULT - SUBJECTIVE AND OBJECTIVE BOX
Patient is a 72y old  Male who presents with a chief complaint of Sepsis due to Pneumonia     Patient seen and examined at bedside. Pt states he is feeling much better, denies overnight events or current complaints including chest pain, shortness of breath, dizziness, nausea, vomiting, diarrhea, fever or chills.      ALLERGIES:  No Known Allergies    MEDICATIONS  (STANDING):  albuterol/ipratropium for Nebulization 3 milliLiter(s) Nebulizer every 6 hours  aspirin enteric coated 81 milliGRAM(s) Oral daily  azithromycin  IVPB 500 milliGRAM(s) IV Intermittent every 24 hours  budesonide 160 MICROgram(s)/formoterol 4.5 MICROgram(s) Inhaler 2 Puff(s) Inhalation two times a day  cefTRIAXone   IVPB 1000 milliGRAM(s) IV Intermittent every 24 hours  cholecalciferol 1000 Unit(s) Oral daily  diltiazem    milliGRAM(s) Oral daily  influenza  Vaccine (HIGH DOSE) 0.7 milliLiter(s) IntraMuscular once  losartan 25 milliGRAM(s) Oral daily  montelukast 10 milliGRAM(s) Oral at bedtime  polyethylene glycol 3350 17 Gram(s) Oral daily  rivaroxaban 20 milliGRAM(s) Oral <User Schedule>    MEDICATIONS  (PRN):  acetaminophen     Tablet .. 650 milliGRAM(s) Oral every 6 hours PRN Temp greater or equal to 38C (100.4F), Mild Pain (1 - 3)  guaifenesin/dextromethorphan Oral Liquid 10 milliLiter(s) Oral every 4 hours PRN Cough  hemorrhoidal Ointment 4 Application(s) Rectal four times a day PRN hemorrhoid irritation  ondansetron Injectable 4 milliGRAM(s) IV Push every 8 hours PRN Nausea and/or Vomiting  traMADol 50 milliGRAM(s) Oral every 8 hours PRN Moderate Pain (4 - 6)    Vital Signs Last 24 Hrs  T(F): 97.2 (13 Mar 2023 06:46), Max: 97.7 (12 Mar 2023 20:05)  HR: 75 (13 Mar 2023 09:14) (62 - 79)  BP: 156/97 (13 Mar 2023 06:46) (151/88 - 156/97)  RR: 18 (13 Mar 2023 06:46) (18 - 18)  SpO2: 97% (13 Mar 2023 09:14) (92% - 98%)  I&O's Summary    13 Mar 2023 07:01  -  13 Mar 2023 10:42  --------------------------------------------------------  IN: 820 mL / OUT: 0 mL / NET: 820 mL      PHYSICAL EXAM:  General: NAD, A/O x 3  ENT: MMM, no oral thrush   Neck: Supple, No JVD  Lungs: decreased to auscultation bilaterally, non labored breathing  Cardio: RRR, S1/S2, No murmurs  Abdomen: Soft, Nontender, Nondistended; Bowel sounds present  Extremities: No calf tenderness, No pitting edema    LABS:                        14.4   8.00  )-----------( 176      ( 13 Mar 2023 07:00 )             47.0     03-13    140  |  106  |  15  ----------------------------<  161  4.2   |  23  |  0.90    Ca    9.0      13 Mar 2023 07:00  Phos  3.8     03-13  Mg     1.8     03-13    TPro  7.3  /  Alb  3.1  /  TBili  0.6  /  DBili  x   /  AST  25  /  ALT  20  /  AlkPhos  65  03-13      PT/INR - ( 12 Mar 2023 08:20 )   PT: 18.1 sec;   INR: 1.55 ratio         PTT - ( 12 Mar 2023 08:20 )  PTT:37.9 sec  Lactate, Blood: 1.8 mmol/L (-12 @ 08:20)    CARDIAC MARKERS ( 12 Mar 2023 08:50 )  x     / 17.0 ng/L / x     / x     / x                            Urinalysis Basic - ( 12 Mar 2023 08:29 )    Color: Yellow / Appearance: Clear / S.015 / pH: x  Gluc: x / Ketone: Negative  / Bili: Negative / Urobili: Negative   Blood: x / Protein: 30 mg/dL / Nitrite: Negative   Leuk Esterase: Negative / RBC: Negative /HPF / WBC 0-2 /HPF   Sq Epi: x / Non Sq Epi: Neg.-Few / Bacteria: Negative /HPF            RADIOLOGY & ADDITIONAL TESTS:  < from: Xray Chest 1 View- PORTABLE-Urgent (23 @ 08:58) >  Impression:    Cardiomegaly    There is right perihilar airspace opacity. Correlate for pneumonia.    --- End of Report ---             ENDY FELIX DO; Attending Radiologist  This document has been electronically signed. Mar 12 2023 11:45AM    < end of copied text >    Care Discussed with Consultants/Other Providers:

## 2023-03-14 ENCOUNTER — TRANSCRIPTION ENCOUNTER (OUTPATIENT)
Age: 73
End: 2023-03-14

## 2023-03-14 LAB
LEGIONELLA AG UR QL: NEGATIVE — SIGNIFICANT CHANGE UP
S PNEUM AG UR QL: NEGATIVE — SIGNIFICANT CHANGE UP

## 2023-03-14 PROCEDURE — 99232 SBSQ HOSP IP/OBS MODERATE 35: CPT | Mod: FS

## 2023-03-14 RX ADMIN — BUDESONIDE AND FORMOTEROL FUMARATE DIHYDRATE 2 PUFF(S): 160; 4.5 AEROSOL RESPIRATORY (INHALATION) at 22:26

## 2023-03-14 RX ADMIN — Medication 3 MILLILITER(S): at 22:26

## 2023-03-14 RX ADMIN — MONTELUKAST 10 MILLIGRAM(S): 4 TABLET, CHEWABLE ORAL at 21:02

## 2023-03-14 RX ADMIN — LOSARTAN POTASSIUM 25 MILLIGRAM(S): 100 TABLET, FILM COATED ORAL at 06:12

## 2023-03-14 RX ADMIN — Medication 3 MILLILITER(S): at 09:07

## 2023-03-14 RX ADMIN — Medication 1000 UNIT(S): at 13:34

## 2023-03-14 RX ADMIN — Medication 10 MILLILITER(S): at 13:34

## 2023-03-14 RX ADMIN — Medication 3 MILLILITER(S): at 04:55

## 2023-03-14 RX ADMIN — Medication 240 MILLIGRAM(S): at 06:12

## 2023-03-14 RX ADMIN — Medication 81 MILLIGRAM(S): at 12:34

## 2023-03-14 RX ADMIN — Medication 3 MILLILITER(S): at 15:45

## 2023-03-14 RX ADMIN — BUDESONIDE AND FORMOTEROL FUMARATE DIHYDRATE 2 PUFF(S): 160; 4.5 AEROSOL RESPIRATORY (INHALATION) at 09:08

## 2023-03-14 RX ADMIN — RIVAROXABAN 20 MILLIGRAM(S): KIT at 08:31

## 2023-03-14 NOTE — PROGRESS NOTE ADULT - ASSESSMENT
71 yo man with history of Chronic Atrial Fibrillation, COPD, BARRON on CPAP, Low Back Pain, Hypertension, and ascending aortic hemiarch repair with graft via right thoracotomy / YORDY on 2/8/2018 comes to ED for cough.    Sepsis likely due to Human Metapneumovirus (hMPV)  - Tmax 102.7, HR 94, RR 22  -Chest x ray concerning for PNA, likely viral  - Received dose of empiric rocephin and zithromax  - monitor off abx, PCT 0.04  - Cultures negative, afebrile, hemodynamically stable, Not hypoxic  - Contact precaution for hMPV  - Albuterol/Atrovent 1 unit neb Q6hrs ATC x 3 days then albuterol HFA PRN.  - Robitussin-DM PRN for cough.    COPD  - BARRON on CPAP  - mild exacerbation but no need to steroids or supplemental O2.  - Duonebs ATC for 3 days  - Continue symbicort in place of advair  - Continue montelukast    Chronic Atrial Fibrillation  - EKG shows a. fib but rate controlled  - Continue xarelto and cardizem    History of Ascending aortic hemiarch repair  - TTE revealed normal EF, mod to severe LT atrial enlargement, grade 3 DD, mod enlarged RT atrium, mod MR/TR, mild pulm htn    Hypertension  - Continue losartan and cardizem  - Monitor vital signs while septic     DVT Prophylaxis with xarelto    Anticipate discharge home tomorrow if stable    GOC: DNR/DNI    Wife Nubia yip [phone]  h [phone]    Will update family at bedside.

## 2023-03-14 NOTE — DISCHARGE NOTE PROVIDER - NSDCCPCAREPLAN_GEN_ALL_CORE_FT
PRINCIPAL DISCHARGE DIAGNOSIS  Diagnosis: Pneumonia  Assessment and Plan of Treatment: you were admitted to the hospital with viral pneumonia.  your respiratory status is stable.  you are cleared for discharge home.

## 2023-03-14 NOTE — DISCHARGE NOTE PROVIDER - ATTENDING DISCHARGE PHYSICAL EXAMINATION:
General: NAD, A/O x 3  ENT: MMM, no thrush  Neck: Supple, No JVD  Lungs: Clear to auscultation bilaterally, non labored, good air entry  Cardio: RRR, S1/S2, No murmurs  Abdomen: Soft, Nontender, Nondistended; Bowel sounds present  Extremities: No cyanosis, No edema

## 2023-03-14 NOTE — PROGRESS NOTE ADULT - SUBJECTIVE AND OBJECTIVE BOX
Patient is a 72y old  Male who presents with a chief complaint of Sepsis due to Pneumonia (13 Mar 2023 10:42)    Patient seen and examined at bedside.  no acute events overnight    ALLERGIES:  azithromycin (Other (Mild))  ceftriaxone (Other (Mild))        Vital Signs Last 24 Hrs  T(F): 97.5 (14 Mar 2023 05:46), Max: 98.2 (13 Mar 2023 16:19)  HR: 76 (14 Mar 2023 09:13) (76 - 92)  BP: 147/89 (14 Mar 2023 05:46) (147/89 - 155/99)  RR: 18 (14 Mar 2023 05:46) (17 - 18)  SpO2: 97% (14 Mar 2023 09:13) (95% - 100%)  I&O's Summary    13 Mar 2023 07:01  -  14 Mar 2023 07:00  --------------------------------------------------------  IN: 820 mL / OUT: 0 mL / NET: 820 mL    14 Mar 2023 07:01  -  14 Mar 2023 10:29  --------------------------------------------------------  IN: 560 mL / OUT: 0 mL / NET: 560 mL      MEDICATIONS:  acetaminophen     Tablet .. 650 milliGRAM(s) Oral every 6 hours PRN  albuterol/ipratropium for Nebulization 3 milliLiter(s) Nebulizer every 6 hours  aspirin enteric coated 81 milliGRAM(s) Oral daily  budesonide 160 MICROgram(s)/formoterol 4.5 MICROgram(s) Inhaler 2 Puff(s) Inhalation two times a day  cholecalciferol 1000 Unit(s) Oral daily  diltiazem    milliGRAM(s) Oral daily  diphenhydrAMINE 25 milliGRAM(s) Oral every 4 hours PRN  guaifenesin/dextromethorphan Oral Liquid 10 milliLiter(s) Oral every 4 hours PRN  hemorrhoidal Ointment 4 Application(s) Rectal four times a day PRN  influenza  Vaccine (HIGH DOSE) 0.7 milliLiter(s) IntraMuscular once  losartan 25 milliGRAM(s) Oral daily  montelukast 10 milliGRAM(s) Oral at bedtime  ondansetron Injectable 4 milliGRAM(s) IV Push every 8 hours PRN  polyethylene glycol 3350 17 Gram(s) Oral daily  rivaroxaban 20 milliGRAM(s) Oral <User Schedule>  traMADol 50 milliGRAM(s) Oral every 8 hours PRN      PHYSICAL EXAM:  General: NAD, A/O x 3  ENT: MMM, no oral thrush  Neck: Supple, No JVD  Lungs: Clear to auscultation bilaterally, non labored, good air entry  Cardio: RRR, S1/S2, No murmurs  Abdomen: Soft, Nontender, Nondistended; Bowel sounds present  Extremities: No cyanosis, No edema    LABS:                        14.4   8.00  )-----------( 176      ( 13 Mar 2023 07:00 )             47.0     03-13    140  |  106  |  15  ----------------------------<  161  4.2   |  23  |  0.90    Ca    9.0      13 Mar 2023 07:00  Phos  3.8     03-13  Mg     1.8     03-13    TPro  7.3  /  Alb  3.1  /  TBili  0.6  /  DBili  x   /  AST  25  /  ALT  20  /  AlkPhos  65  03-13      PT/INR - ( 12 Mar 2023 08:20 )   PT: 18.1 sec;   INR: 1.55 ratio         PTT - ( 12 Mar 2023 08:20 )  PTT:37.9 sec  Lactate, Blood: 1.8 mmol/L (12 @ 08:20)    CARDIAC MARKERS ( 12 Mar 2023 08:50 )  x     / 17.0 ng/L / x     / x     / x                            Urinalysis Basic - ( 12 Mar 2023 08:29 )    Color: Yellow / Appearance: Clear / S.015 / pH: x  Gluc: x / Ketone: Negative  / Bili: Negative / Urobili: Negative   Blood: x / Protein: 30 mg/dL / Nitrite: Negative   Leuk Esterase: Negative / RBC: Negative /HPF / WBC 0-2 /HPF   Sq Epi: x / Non Sq Epi: Neg.-Few / Bacteria: Negative /HPF        Culture - Urine (collected 12 Mar 2023 08:29)  Source: Clean Catch Clean Catch (Midstream)  Final Report (13 Mar 2023 11:33):    <10,000 CFU/mL Normal Urogenital Samara    Culture - Blood (collected 12 Mar 2023 08:20)  Source: .Blood Blood  Preliminary Report (13 Mar 2023 13:02):    No growth to date.    Culture - Blood (collected 12 Mar 2023 08:20)  Source: .Blood Blood  Preliminary Report (13 Mar 2023 13:02):    No growth to date.          RADIOLOGY & ADDITIONAL TESTS:    Care Discussed with Consultants/Other Providers:

## 2023-03-14 NOTE — DISCHARGE NOTE PROVIDER - NSDCMRMEDTOKEN_GEN_ALL_CORE_FT
Advair Diskus 250 mcg-50 mcg inhalation powder: 1 puff(s) inhaled 2 times a day  aspirin 81 mg oral delayed release tablet: 1 tab(s) orally once a day  Cartia  mg/24 hours oral capsule, extended release: 1 cap(s) orally once a day  losartan 25 mg oral tablet: 1 tab(s) orally once a day  montelukast 10 mg oral tablet: 1 tab(s) orally once a day  polyethylene glycol 3350 oral powder for reconstitution: 17 gram(s) orally once a day  ProAir HFA 90 mcg/inh inhalation aerosol: 2 puff(s) inhaled every 6 hours, As Needed  traMADol 50 mg oral tablet:   Vitamin D3 5000 intl units oral capsule: 1 cap(s) orally once a day  Xarelto 20 mg oral tablet: orally once a day   Advair Diskus 250 mcg-50 mcg inhalation powder: 1 puff(s) inhaled 2 times a day  aspirin 81 mg oral delayed release tablet: 1 tab(s) orally once a day  Cartia  mg/24 hours oral capsule, extended release: 1 cap(s) orally once a day  losartan 25 mg oral tablet: 1 tab(s) orally once a day  montelukast 10 mg oral tablet: 1 tab(s) orally once a day  polyethylene glycol 3350 oral powder for reconstitution: 17 gram(s) orally once a day  ProAir HFA 90 mcg/inh inhalation aerosol: 2 puff(s) inhaled every 6 hours, As Needed  traMADol 50 mg oral tablet: 1 tab(s) orally every 6 hours, As Needed  Vitamin D3 5000 intl units oral capsule: 1 cap(s) orally once a day  Xarelto 20 mg oral tablet: orally once a day

## 2023-03-14 NOTE — DISCHARGE NOTE PROVIDER - HOSPITAL COURSE
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  In ED found to be febrile Tmax 102.7F, HR 94, RR22, CXR showed possible RML infiltrate, found to have human metapneumovirus, concern for superimposed bacterial pneumonia.  Admitted with sepsis due to pneumonia.  Given doses of empiric rocephin and zithromax.  Cultures remained negative, PCT came back 0.04.  Clinically improved, stable.  Antibiotics stopped due to low concern for superimposed bacterial process.  TTE showed normal EF, mod to severe LT atrial enlargement, grade 3 DD, mod enlarged RT atrium, mod MR/TR, mild pulm htn.  Pt continued to improve, stable respiratory status.  Medically cleared for discharge home.    Source of Infection:  Antibiotic / Last Day: Received one dose of empric rocephin and zithromax    Palliative Care / Advanced Care Planning  Code Status: DNR/DNI  Patient/Family agreeable to Hospice/Palliative (Y/N)?  Summary of Goals of Care Conversation:    Discharging Provider:  Lee Dickey NP  Contact Info: Cell [phone] - Please call with any questions or concerns.    Outpatient Provider: Dr. Farooq Ram

## 2023-03-14 NOTE — DISCHARGE NOTE PROVIDER - CARE PROVIDER_API CALL
Farooq Christy)  Critical Care Medicine; Internal Medicine; Pulmonary Disease  [address] [place] [address]  Phone: [phone]  Fax: [phone]  Follow Up Time:

## 2023-03-15 ENCOUNTER — TRANSCRIPTION ENCOUNTER (OUTPATIENT)
Age: 73
End: 2023-03-15

## 2023-03-15 VITALS — OXYGEN SATURATION: 98 %

## 2023-03-15 LAB
ANION GAP SERPL CALC-SCNC: 9 MMOL/L — SIGNIFICANT CHANGE UP (ref 5–17)
BUN SERPL-MCNC: 22 MG/DL — SIGNIFICANT CHANGE UP (ref 7–23)
CALCIUM SERPL-MCNC: 8.8 MG/DL — SIGNIFICANT CHANGE UP (ref 8.4–10.5)
CHLORIDE SERPL-SCNC: 105 MMOL/L — SIGNIFICANT CHANGE UP (ref 96–108)
CO2 SERPL-SCNC: 27 MMOL/L — SIGNIFICANT CHANGE UP (ref 22–31)
CREAT SERPL-MCNC: 1.03 MG/DL — SIGNIFICANT CHANGE UP (ref 0.5–1.3)
EGFR: 76 ML/MIN/1.73M2 — SIGNIFICANT CHANGE UP
GLUCOSE SERPL-MCNC: 104 MG/DL — HIGH (ref 70–99)
HCT VFR BLD CALC: 46.9 % — SIGNIFICANT CHANGE UP (ref 39–50)
HGB BLD-MCNC: 14.2 G/DL — SIGNIFICANT CHANGE UP (ref 13–17)
MCHC RBC-ENTMCNC: 26.3 PG — LOW (ref 27–34)
MCHC RBC-ENTMCNC: 30.3 GM/DL — LOW (ref 32–36)
MCV RBC AUTO: 87 FL — SIGNIFICANT CHANGE UP (ref 80–100)
NRBC # BLD: 0 /100 WBCS — SIGNIFICANT CHANGE UP (ref 0–0)
NT-PROBNP SERPL-SCNC: 516 PG/ML — HIGH (ref 0–300)
PLATELET # BLD AUTO: 176 K/UL — SIGNIFICANT CHANGE UP (ref 150–400)
POTASSIUM SERPL-MCNC: 3.8 MMOL/L — SIGNIFICANT CHANGE UP (ref 3.5–5.3)
POTASSIUM SERPL-SCNC: 3.8 MMOL/L — SIGNIFICANT CHANGE UP (ref 3.5–5.3)
RBC # BLD: 5.39 M/UL — SIGNIFICANT CHANGE UP (ref 4.2–5.8)
RBC # FLD: 18.3 % — HIGH (ref 10.3–14.5)
SODIUM SERPL-SCNC: 141 MMOL/L — SIGNIFICANT CHANGE UP (ref 135–145)
WBC # BLD: 9.48 K/UL — SIGNIFICANT CHANGE UP (ref 3.8–10.5)
WBC # FLD AUTO: 9.48 K/UL — SIGNIFICANT CHANGE UP (ref 3.8–10.5)

## 2023-03-15 PROCEDURE — 0225U NFCT DS DNA&RNA 21 SARSCOV2: CPT

## 2023-03-15 PROCEDURE — 94660 CPAP INITIATION&MGMT: CPT

## 2023-03-15 PROCEDURE — 80053 COMPREHEN METABOLIC PANEL: CPT

## 2023-03-15 PROCEDURE — 84100 ASSAY OF PHOSPHORUS: CPT

## 2023-03-15 PROCEDURE — 85025 COMPLETE CBC W/AUTO DIFF WBC: CPT

## 2023-03-15 PROCEDURE — 93306 TTE W/DOPPLER COMPLETE: CPT

## 2023-03-15 PROCEDURE — 85027 COMPLETE CBC AUTOMATED: CPT

## 2023-03-15 PROCEDURE — 83735 ASSAY OF MAGNESIUM: CPT

## 2023-03-15 PROCEDURE — 87899 AGENT NOS ASSAY W/OPTIC: CPT

## 2023-03-15 PROCEDURE — 83605 ASSAY OF LACTIC ACID: CPT

## 2023-03-15 PROCEDURE — 93005 ELECTROCARDIOGRAM TRACING: CPT

## 2023-03-15 PROCEDURE — 71045 X-RAY EXAM CHEST 1 VIEW: CPT

## 2023-03-15 PROCEDURE — 96365 THER/PROPH/DIAG IV INF INIT: CPT

## 2023-03-15 PROCEDURE — 87040 BLOOD CULTURE FOR BACTERIA: CPT

## 2023-03-15 PROCEDURE — 87449 NOS EACH ORGANISM AG IA: CPT

## 2023-03-15 PROCEDURE — 99239 HOSP IP/OBS DSCHRG MGMT >30: CPT

## 2023-03-15 PROCEDURE — 96375 TX/PRO/DX INJ NEW DRUG ADDON: CPT

## 2023-03-15 PROCEDURE — 84484 ASSAY OF TROPONIN QUANT: CPT

## 2023-03-15 PROCEDURE — 99285 EMERGENCY DEPT VISIT HI MDM: CPT | Mod: 25

## 2023-03-15 PROCEDURE — 86803 HEPATITIS C AB TEST: CPT

## 2023-03-15 PROCEDURE — 85730 THROMBOPLASTIN TIME PARTIAL: CPT

## 2023-03-15 PROCEDURE — 87086 URINE CULTURE/COLONY COUNT: CPT

## 2023-03-15 PROCEDURE — 84145 PROCALCITONIN (PCT): CPT

## 2023-03-15 PROCEDURE — 80048 BASIC METABOLIC PNL TOTAL CA: CPT

## 2023-03-15 PROCEDURE — 94640 AIRWAY INHALATION TREATMENT: CPT

## 2023-03-15 PROCEDURE — 81001 URINALYSIS AUTO W/SCOPE: CPT

## 2023-03-15 PROCEDURE — 36415 COLL VENOUS BLD VENIPUNCTURE: CPT

## 2023-03-15 PROCEDURE — 85610 PROTHROMBIN TIME: CPT

## 2023-03-15 PROCEDURE — 83880 ASSAY OF NATRIURETIC PEPTIDE: CPT

## 2023-03-15 RX ORDER — TRAMADOL HYDROCHLORIDE 50 MG/1
0 TABLET ORAL
Qty: 0 | Refills: 0 | DISCHARGE

## 2023-03-15 RX ORDER — TRAMADOL HYDROCHLORIDE 50 MG/1
1 TABLET ORAL
Qty: 0 | Refills: 0 | DISCHARGE

## 2023-03-15 RX ADMIN — Medication 240 MILLIGRAM(S): at 05:29

## 2023-03-15 RX ADMIN — LOSARTAN POTASSIUM 25 MILLIGRAM(S): 100 TABLET, FILM COATED ORAL at 05:29

## 2023-03-15 RX ADMIN — BUDESONIDE AND FORMOTEROL FUMARATE DIHYDRATE 2 PUFF(S): 160; 4.5 AEROSOL RESPIRATORY (INHALATION) at 08:53

## 2023-03-15 RX ADMIN — Medication 3 MILLILITER(S): at 08:53

## 2023-03-15 RX ADMIN — Medication 3 MILLILITER(S): at 03:04

## 2023-03-15 RX ADMIN — Medication 10 MILLILITER(S): at 14:00

## 2023-03-15 RX ADMIN — RIVAROXABAN 20 MILLIGRAM(S): KIT at 14:01

## 2023-03-15 RX ADMIN — Medication 1000 UNIT(S): at 14:00

## 2023-03-15 RX ADMIN — Medication 81 MILLIGRAM(S): at 14:00

## 2023-03-15 NOTE — PROGRESS NOTE ADULT - NS ATTEND AMEND GEN_ALL_CORE FT
Pt seen and examined at bedside.  No acute events overnight  Pt presented w/ sepsis and ARFH secondary to RML PNA which is secondary to hMPV  as opposed to bacterial etiology  Abx were discontinued yesterday  Nontoxic appearing, not hypoxic  Monitor for another 24hrs, OOB and ambulation  Anticipate discharge home tomorrow if continues to be stable
Pt seen and examined at bedside.  No acute events overnight  Pt presented w/ sepsis and ARFH secondary to RML PNA which is secondary to hMPV  as opposed to bacterial etiology  Abx were discontinued yesterday  Nontoxic appearing, not hypoxic  Stable for discharge home
Pt seen and examined at bedside.  No acute events overnight  Pt presented w/ sepsis and ARFH secondary to RML PNA + hMPV   Continue Ceftriaxone/Azithromycin   Supplemental O2 as needed. Wean as tolerated.   Continue isolation  Monitor

## 2023-03-15 NOTE — PROGRESS NOTE ADULT - ASSESSMENT
71 yo man with history of Chronic Atrial Fibrillation, COPD, BARRON on CPAP, Low Back Pain, Hypertension, and ascending aortic hemiarch repair with graft via right thoracotomy / YORDY on 2/8/2018 comes to ED for cough.    Sepsis likely due to Human Metapneumovirus (hMPV)  - Tmax 102.7, HR 94, RR 22  -Chest x ray concerning for PNA, likely viral  - Received dose of empiric rocephin and zithromax  - monitor off abx, PCT 0.04  - Cultures negative, afebrile, hemodynamically stable, Not hypoxic  - Contact precaution for hMPV  - Albuterol/Atrovent 1 unit neb Q6hrs ATC x 3 days then albuterol HFA PRN.  - Robitussin-DM PRN for cough.    COPD  - BARRON on CPAP  - mild exacerbation but no need to steroids or supplemental O2.  - Duonebs ATC for 3 days  - Continue symbicort in place of advair  - Continue montelukast    Chronic Atrial Fibrillation  - EKG shows a. fib but rate controlled  - Continue xarelto and cardizem    History of Ascending aortic hemiarch repair  - TTE revealed normal EF, mod to severe LT atrial enlargement, grade 3 DD, mod enlarged RT atrium, mod MR/TR, mild pulm htn    Hypertension  - Continue losartan and cardizem  - Monitor vital signs while septic     DVT Prophylaxis with xarelto    Anticipate discharge home today    GOC: DNR/DNI    Wife Nubia yip [phone]  h [phone]    Will update family at bedside.

## 2023-03-15 NOTE — PROGRESS NOTE ADULT - SUBJECTIVE AND OBJECTIVE BOX
Patient is a 72y old  Male who presents with a chief complaint of Sepsis due to Pneumonia (14 Mar 2023 16:35)    Patient seen and examined at bedside.  no acute events overnight    ALLERGIES:  azithromycin (Other (Mild))  ceftriaxone (Other (Mild))        Vital Signs Last 24 Hrs  T(F): 97.7 (15 Mar 2023 05:17), Max: 97.8 (14 Mar 2023 20:01)  HR: 92 (15 Mar 2023 05:17) (76 - 92)  BP: 133/84 (15 Mar 2023 05:17) (133/84 - 144/83)  RR: 16 (15 Mar 2023 05:17) (16 - 18)  SpO2: 100% (15 Mar 2023 05:17) (95% - 100%)  I&O's Summary    14 Mar 2023 07:01  -  15 Mar 2023 07:00  --------------------------------------------------------  IN: 560 mL / OUT: 0 mL / NET: 560 mL      MEDICATIONS:  acetaminophen     Tablet .. 650 milliGRAM(s) Oral every 6 hours PRN  albuterol    90 MICROgram(s) HFA Inhaler 2 Puff(s) Inhalation every 6 hours PRN  aspirin enteric coated 81 milliGRAM(s) Oral daily  budesonide 160 MICROgram(s)/formoterol 4.5 MICROgram(s) Inhaler 2 Puff(s) Inhalation two times a day  cholecalciferol 1000 Unit(s) Oral daily  diltiazem    milliGRAM(s) Oral daily  diphenhydrAMINE 25 milliGRAM(s) Oral every 4 hours PRN  guaifenesin/dextromethorphan Oral Liquid 10 milliLiter(s) Oral every 4 hours PRN  hemorrhoidal Ointment 4 Application(s) Rectal four times a day PRN  influenza  Vaccine (HIGH DOSE) 0.7 milliLiter(s) IntraMuscular once  losartan 25 milliGRAM(s) Oral daily  montelukast 10 milliGRAM(s) Oral at bedtime  ondansetron Injectable 4 milliGRAM(s) IV Push every 8 hours PRN  polyethylene glycol 3350 17 Gram(s) Oral daily  rivaroxaban 20 milliGRAM(s) Oral <User Schedule>  traMADol 50 milliGRAM(s) Oral every 8 hours PRN      PHYSICAL EXAM:  General: NAD, A/O x 3  ENT: MMM, no thrush  Neck: Supple, No JVD  Lungs: Clear to auscultation bilaterally, non labored, good air entry  Cardio: RRR, S1/S2, No murmurs  Abdomen: Soft, Nontender, Nondistended; Bowel sounds present  Extremities: No cyanosis, No edema    LABS:                        14.2   9.48  )-----------( 176      ( 15 Mar 2023 06:10 )             46.9     03-15    141  |  105  |  22  ----------------------------<  104  3.8   |  27  |  1.03    Ca    8.8      15 Mar 2023 06:10  Phos  3.8     03-13  Mg     1.8     03-13    TPro  7.3  /  Alb  3.1  /  TBili  0.6  /  DBili  x   /  AST  25  /  ALT  20  /  AlkPhos  65  03-13                                    Culture - Urine (collected 12 Mar 2023 08:29)  Source: Clean Catch Clean Catch (Midstream)  Final Report (13 Mar 2023 11:33):    <10,000 CFU/mL Normal Urogenital Samara    Culture - Blood (collected 12 Mar 2023 08:20)  Source: .Blood Blood  Preliminary Report (13 Mar 2023 13:02):    No growth to date.    Culture - Blood (collected 12 Mar 2023 08:20)  Source: .Blood Blood  Preliminary Report (13 Mar 2023 13:02):    No growth to date.          RADIOLOGY & ADDITIONAL TESTS:    Care Discussed with Consultants/Other Providers:

## 2023-03-15 NOTE — DISCHARGE NOTE NURSING/CASE MANAGEMENT/SOCIAL WORK - PATIENT PORTAL LINK FT
You can access the FollowMyHealth Patient Portal offered by [facility] by registering at the following website: [email or web address]. By joining [facility]’s FollowMyHealth portal, you will also be able to view your health information using other applications (apps) compatible with our system.

## 2023-03-16 ENCOUNTER — TRANSCRIPTION ENCOUNTER (OUTPATIENT)
Age: 73
End: 2023-03-16

## 2023-03-17 LAB
CULTURE RESULTS: SIGNIFICANT CHANGE UP
CULTURE RESULTS: SIGNIFICANT CHANGE UP
SPECIMEN SOURCE: SIGNIFICANT CHANGE UP
SPECIMEN SOURCE: SIGNIFICANT CHANGE UP

## 2023-03-21 ENCOUNTER — APPOINTMENT (OUTPATIENT)
Dept: CARE COORDINATION | Facility: HOME HEALTH | Age: 73
End: 2023-03-21
Payer: MEDICARE

## 2023-03-21 VITALS
SYSTOLIC BLOOD PRESSURE: 130 MMHG | OXYGEN SATURATION: 96 % | HEART RATE: 79 BPM | RESPIRATION RATE: 17 BRPM | DIASTOLIC BLOOD PRESSURE: 76 MMHG

## 2023-03-21 DIAGNOSIS — J12.3 HUMAN METAPNEUMOVIRUS PNEUMONIA: ICD-10-CM

## 2023-03-21 PROCEDURE — 99495 TRANSJ CARE MGMT MOD F2F 14D: CPT

## 2023-03-21 RX ORDER — POLYETHYLENE GLYCOL 3350 17 G/17G
17 POWDER, FOR SOLUTION ORAL DAILY
Qty: 1 | Refills: 0 | Status: ACTIVE | COMMUNITY
Start: 2023-03-21

## 2023-03-21 RX ORDER — ALBUTEROL SULFATE 90 UG/1
108 (90 BASE) POWDER, METERED RESPIRATORY (INHALATION) EVERY 6 HOURS
Refills: 0 | Status: ACTIVE | COMMUNITY
Start: 2023-03-21

## 2023-03-21 RX ORDER — MONTELUKAST 10 MG/1
10 TABLET, FILM COATED ORAL
Qty: 1 | Refills: 1 | Status: ACTIVE | COMMUNITY
Start: 2023-03-21

## 2023-03-21 NOTE — PHYSICAL EXAM
[No Acute Distress] : no acute distress [Well Nourished] : well nourished [Normal Sclera/Conjunctiva] : normal sclera/conjunctiva [Normal Outer Ear/Nose] : the outer ears and nose were normal in appearance [No JVD] : no jugular venous distention [No Respiratory Distress] : no respiratory distress  [Clear to Auscultation] : lungs were clear to auscultation bilaterally [Normal Rate] : normal rate  [No Edema] : there was no peripheral edema [Soft] : abdomen soft [No CVA Tenderness] : no CVA  tenderness [Normal Affect] : the affect was normal [Normal Insight/Judgement] : insight and judgment were intact

## 2023-03-24 ENCOUNTER — TRANSCRIPTION ENCOUNTER (OUTPATIENT)
Age: 73
End: 2023-03-24

## 2023-05-12 RX ORDER — ASPIRIN ENTERIC COATED TABLETS 81 MG 81 MG/1
81 TABLET, DELAYED RELEASE ORAL DAILY
Refills: 0 | Status: DISCONTINUED | COMMUNITY
End: 2023-05-12

## 2023-06-30 ENCOUNTER — RX RENEWAL (OUTPATIENT)
Age: 73
End: 2023-06-30

## 2023-07-14 NOTE — ASU PATIENT PROFILE, ADULT - LANGUAGE ASSISTANCE NEEDED
Patient Call    Who are you speaking with? Patient    If it is not the patient, are they listed on an active communication consent form? N/A   What is the reason for this call? Patient calling in wanting to inform Dr Axel Hogue that she is keeping her MRI, catheter maintenance, and chemo treatment appointments. The patient states she is going to go through with her treatment. Does this require a call back? No   If a call back is required, please list best call back number N/A   If a call back is required, advise that a message will be forwarded to their care team and someone will return their call as soon as possible. Did you relay this information to the patient?  No No-Patient/Caregiver offered and refused free interpretation services.

## 2023-07-25 ENCOUNTER — APPOINTMENT (OUTPATIENT)
Dept: CARDIOLOGY | Facility: CLINIC | Age: 73
End: 2023-07-25
Payer: MEDICARE

## 2023-07-25 ENCOUNTER — NON-APPOINTMENT (OUTPATIENT)
Age: 73
End: 2023-07-25

## 2023-07-25 VITALS
RESPIRATION RATE: 15 BRPM | DIASTOLIC BLOOD PRESSURE: 70 MMHG | HEIGHT: 74 IN | SYSTOLIC BLOOD PRESSURE: 130 MMHG | HEART RATE: 71 BPM | WEIGHT: 258 LBS | BODY MASS INDEX: 33.11 KG/M2 | OXYGEN SATURATION: 98 %

## 2023-07-25 VITALS
WEIGHT: 258 LBS | SYSTOLIC BLOOD PRESSURE: 130 MMHG | OXYGEN SATURATION: 98 % | BODY MASS INDEX: 33.13 KG/M2 | HEART RATE: 71 BPM | DIASTOLIC BLOOD PRESSURE: 70 MMHG

## 2023-07-25 DIAGNOSIS — I34.0 NONRHEUMATIC MITRAL (VALVE) INSUFFICIENCY: ICD-10-CM

## 2023-07-25 PROCEDURE — 99214 OFFICE O/P EST MOD 30 MIN: CPT

## 2023-07-25 PROCEDURE — 93000 ELECTROCARDIOGRAM COMPLETE: CPT

## 2023-07-25 NOTE — DISCUSSION/SUMMARY
[With Me] : with me [___ Month(s)] : in [unfilled] month(s) [FreeTextEntry1] : \par Atrial fibrillation: Permanent; continue present doses of diltiazem and Xarelto.\par \par Status post ascending aortic aneurysm repair: Stable appearance on CT imaging performed 1 year ago; anticipate repeat CT in 2024.\par \par Mitral  and aortic valve insufficiency: Mild to moderate  valvular insufficiencies; observe.\par \par Hypertension: Controlled; continue losartan and diltiazem\par \par

## 2023-07-25 NOTE — REVIEW OF SYSTEMS
[Weight Loss (___ Lbs)] : [unfilled] ~Ulb weight loss [SOB] : no shortness of breath [Chest Discomfort] : no chest discomfort [Palpitations] : no palpitations [Syncope] : no syncope

## 2023-07-25 NOTE — PHYSICAL EXAM
[Obese] : obese [Normal S1, S2] : normal S1, S2 [Clear Lung Fields] : clear lung fields [Alert and Oriented] : alert and oriented [No Acute Distress] : no acute distress [No Murmur] : no murmur [Abnormal Gait] : abnormal gait [de-identified] : Irregular, normal rate [de-identified] : Central obesity

## 2023-07-25 NOTE — HISTORY OF PRESENT ILLNESS
[FreeTextEntry1] : Ayden Paz is a 73-year-old man with a history of ascending aortic aneurysm s/p surgical repair/replacement + Atrial Clip (2018), valvular heart, cardiomyopathy with mild LV systolic dysfunction (now improved), atrial fibrillation, obstructive sleep apnea (uses nocturnal CPAP), hypertension, and COPD who returns for cardiac examination -our last encounter was in January.\par \par He has no new complaints.  He is eating healthier and has had a small amount of weight loss.  He has not been experiencing dyspnea or chest discomfort.  He is adherent with nocturnal CPAP use and reports regular visits with his primary care physician, Dr. Christy.

## 2023-07-25 NOTE — CARDIOLOGY SUMMARY
[de-identified] : 7/25/2023.  Atrial fibrillation.  Nonspecific ST & T abnormality [de-identified] : 7/26/22.  Normal left ventricular size and function with estimated ejection fraction 57%.  Moderate concentric LVH.  Mild aortic root dilation with ascending aortic aneurysm repair.  Severe left atrial enlargement.  Aortic valve sclerosis with mild regurgitation.  Mild to moderate mitral regurgitation.  Moderate tricuspid regurgitation.  Mild to moderate pulmonic regurgitation. [de-identified] : 7/28/2022.  CT Chest/Aorta: Stable ascending thoracic aorta repair. [de-identified] : 1/2/18, Mild diffuse disease with mild to moderate coronary ectasia. LVEF of 45% \par  [de-identified] : 2/8/18, Minimally invasive ascending aorta replacement, transverse arch replacement, left atrial appendage occlusion using an AtriClip \par

## 2023-08-02 ENCOUNTER — RX RENEWAL (OUTPATIENT)
Age: 73
End: 2023-08-02

## 2023-10-09 ENCOUNTER — RX RENEWAL (OUTPATIENT)
Age: 73
End: 2023-10-09

## 2023-10-09 DIAGNOSIS — I35.1 NONRHEUMATIC AORTIC (VALVE) INSUFFICIENCY: ICD-10-CM

## 2023-10-27 ENCOUNTER — RX RENEWAL (OUTPATIENT)
Age: 73
End: 2023-10-27

## 2023-11-28 ENCOUNTER — APPOINTMENT (OUTPATIENT)
Dept: ORTHOPEDIC SURGERY | Facility: CLINIC | Age: 73
End: 2023-11-28
Payer: MEDICARE

## 2023-11-28 VITALS — HEIGHT: 74 IN | BODY MASS INDEX: 33.11 KG/M2 | WEIGHT: 258 LBS

## 2023-11-28 DIAGNOSIS — M75.41 IMPINGEMENT SYNDROME OF RIGHT SHOULDER: ICD-10-CM

## 2023-11-28 DIAGNOSIS — M75.51 BURSITIS OF RIGHT SHOULDER: ICD-10-CM

## 2023-11-28 PROCEDURE — J3490M: CUSTOM | Mod: NC

## 2023-11-28 PROCEDURE — 73010 X-RAY EXAM OF SHOULDER BLADE: CPT | Mod: RT

## 2023-11-28 PROCEDURE — 99204 OFFICE O/P NEW MOD 45 MIN: CPT | Mod: 25

## 2023-11-28 PROCEDURE — 73030 X-RAY EXAM OF SHOULDER: CPT | Mod: RT

## 2023-11-28 PROCEDURE — 20611 DRAIN/INJ JOINT/BURSA W/US: CPT | Mod: RT

## 2024-01-09 ENCOUNTER — APPOINTMENT (OUTPATIENT)
Dept: ORTHOPEDIC SURGERY | Facility: CLINIC | Age: 74
End: 2024-01-09
Payer: MEDICARE

## 2024-01-09 PROCEDURE — 99213 OFFICE O/P EST LOW 20 MIN: CPT

## 2024-01-12 ENCOUNTER — RX RENEWAL (OUTPATIENT)
Age: 74
End: 2024-01-12

## 2024-01-12 RX ORDER — DILTIAZEM HYDROCHLORIDE 240 MG/1
240 CAPSULE, EXTENDED RELEASE ORAL
Qty: 90 | Refills: 1 | Status: ACTIVE | COMMUNITY
Start: 2023-06-30 | End: 1900-01-01

## 2024-01-23 ENCOUNTER — NON-APPOINTMENT (OUTPATIENT)
Age: 74
End: 2024-01-23

## 2024-01-23 ENCOUNTER — APPOINTMENT (OUTPATIENT)
Dept: CARDIOLOGY | Facility: CLINIC | Age: 74
End: 2024-01-23
Payer: MEDICARE

## 2024-01-23 VITALS
RESPIRATION RATE: 14 BRPM | OXYGEN SATURATION: 97 % | BODY MASS INDEX: 33.11 KG/M2 | HEART RATE: 83 BPM | HEIGHT: 74 IN | SYSTOLIC BLOOD PRESSURE: 110 MMHG | DIASTOLIC BLOOD PRESSURE: 60 MMHG | WEIGHT: 258 LBS

## 2024-01-23 VITALS
WEIGHT: 258 LBS | DIASTOLIC BLOOD PRESSURE: 60 MMHG | SYSTOLIC BLOOD PRESSURE: 110 MMHG | HEART RATE: 83 BPM | OXYGEN SATURATION: 97 % | BODY MASS INDEX: 33.13 KG/M2

## 2024-01-23 DIAGNOSIS — I48.91 UNSPECIFIED ATRIAL FIBRILLATION: ICD-10-CM

## 2024-01-23 DIAGNOSIS — I10 ESSENTIAL (PRIMARY) HYPERTENSION: ICD-10-CM

## 2024-01-23 DIAGNOSIS — Z98.890 OTHER SPECIFIED POSTPROCEDURAL STATES: ICD-10-CM

## 2024-01-23 DIAGNOSIS — I77.810 THORACIC AORTIC ECTASIA: ICD-10-CM

## 2024-01-23 DIAGNOSIS — J43.9 EMPHYSEMA, UNSPECIFIED: ICD-10-CM

## 2024-01-23 DIAGNOSIS — Z86.79 OTHER SPECIFIED POSTPROCEDURAL STATES: ICD-10-CM

## 2024-01-23 PROCEDURE — 99214 OFFICE O/P EST MOD 30 MIN: CPT

## 2024-01-23 PROCEDURE — G2211 COMPLEX E/M VISIT ADD ON: CPT

## 2024-01-23 PROCEDURE — 93000 ELECTROCARDIOGRAM COMPLETE: CPT

## 2024-01-23 NOTE — CARDIOLOGY SUMMARY
[de-identified] : 1/23/2024.  Atrial fibrillation.  Nonspecific ST and T abnormality [de-identified] : 7/26/22.  Normal left ventricular size and function with estimated ejection fraction 57%.  Moderate concentric LVH.  Mild aortic root dilation with ascending aortic aneurysm repair.  Severe left atrial enlargement.  Aortic valve sclerosis with mild regurgitation.  Mild to moderate mitral regurgitation.  Moderate tricuspid regurgitation.  Mild to moderate pulmonic regurgitation. [de-identified] : 7/28/2022.  CT Chest/Aorta: Stable ascending thoracic aorta repair. [de-identified] : 1/2/18, Mild diffuse disease with mild to moderate coronary ectasia. LVEF of 45% \par   [de-identified] : 2/8/18, Minimally invasive ascending aorta replacement, transverse arch replacement, left atrial appendage occlusion using an AtriClip \par

## 2024-01-23 NOTE — REASON FOR VISIT
[Arrhythmia/ECG Abnorrmalities] : arrhythmia/ECG abnormalities [Structural Heart and Valve Disease] : structural heart and valve disease [Hypertension] : hypertension [Spouse] : spouse [Carotid, Aortic and Peripheral Vascular Disease] : carotid, aortic and peripheral vascular disease

## 2024-01-23 NOTE — DISCUSSION/SUMMARY
[With Me] : with me [___ Month(s)] : in [unfilled] month(s) [FreeTextEntry1] :  Atrial fibrillation: Permanent; controlled; Tolerating anticoagulation; continue diltiazem and Xarelto.  Status post ascending aortic aneurysm repair: Stable; CT imaging planned for second half of 2024.  Mitral and aortic valve insufficiency: Stable; asymptomatic; mild to moderate insufficiencies; observation.  Hypertension: Controlled; continue losartan and diltiazem; weight loss.  * This visit was conducted as part of ongoing, longitudinal medical care for patient's chronic medical diagnoses and other issues.

## 2024-01-23 NOTE — HISTORY OF PRESENT ILLNESS
[FreeTextEntry1] : Ayden Paz is a 73-year-old man with a history of ascending aortic aneurysm s/p surgical repair/replacement + Atrial Clip (2018), valvular heart, cardiomyopathy with mild LV systolic dysfunction (now improved), atrial fibrillation, obstructive sleep apnea (uses nocturnal CPAP), hypertension, and COPD who returns for cardiac examination.  He has no new cardiovascular complaints--has been feeling well; no chest discomfort, palpitations, or difficulty breathing.

## 2024-01-23 NOTE — PHYSICAL EXAM
[No Acute Distress] : no acute distress [Obese] : obese [Clear Lung Fields] : clear lung fields [Abnormal Gait] : abnormal gait [Alert and Oriented] : alert and oriented [No Edema] : no edema [Normal Speech] : normal speech [de-identified] : Irregular [de-identified] : Central obesity

## 2024-02-06 ENCOUNTER — APPOINTMENT (OUTPATIENT)
Dept: ORTHOPEDIC SURGERY | Facility: CLINIC | Age: 74
End: 2024-02-06
Payer: MEDICARE

## 2024-02-06 DIAGNOSIS — M19.011 PRIMARY OSTEOARTHRITIS, RIGHT SHOULDER: ICD-10-CM

## 2024-02-06 PROCEDURE — 20611 DRAIN/INJ JOINT/BURSA W/US: CPT | Mod: RT

## 2024-02-06 PROCEDURE — 99214 OFFICE O/P EST MOD 30 MIN: CPT | Mod: 25

## 2024-02-06 PROCEDURE — J3490M: CUSTOM | Mod: NC

## 2024-02-06 NOTE — PROCEDURE
[FreeTextEntry3] :  Injection Procedure Note:  The risks, benefits, and alternatives to corticosteroid injection were reviewed with the patient.  Risks outlined include but are not limited to infection, sepsis, bleeding, scarring, skin discoloration, temporary increase in pain, syncopal episode, failure to resolve symptoms, symptoms recurrence, allergic reaction, flare reaction, and elevation of blood sugar in diabetics.  Patient understood the risks and asked to proceed with this treatment course.  Patient Identification Name/: Verbal with patient and/or family  Procedure Verification: Procedure confirmed with patient or family/designee Consent for procedure: Verbal Consent Given Relevant documentation completed, reviewed, and signed Clinical indications for procedure confirmed  Time-out with all members of procedure team immediately prior to procedure: Correct patient identified. Agreement on procedure. Correct side and site.  ULTRASOUND GUIDED SHOULDER GLENOHUMERAL INJECTION - RIGHT  After verbal consent and identification of the correct patient and correct site, the posterior right shoulder was prepped using alcohol swabs and betadine. This was allowed time to air dry. After ethyl choride spray for skin anesthesia, a mixture of 1cc DepoMedrol 40mg/ml, 3cc Lidocaine 1%, and 3cc Bupivacaine 0.5% was injected under ultrasound guidance into the glenohumeral joint from posterior using a sterile 22G needle. Visualization of the needle and placement of the injection was performed without any complications.  The patient tolerated the procedure well. After-care instructions were provided and included instructions to ice the area and to call if redness, pain, or fever develop.

## 2024-02-06 NOTE — HISTORY OF PRESENT ILLNESS
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 Has tried Tyelenol, icing PMHX CABG - *Can't take NSAIDS, on xarelto**  1/9/24- R shoulder CSI (11/28/23) with improvement, sent for PT 2/6/24 - Increased pain, no new PAIGE, PT at [facility] in [place] 3x/week. but worsening pain the last few weeks

## 2024-02-06 NOTE — ASSESSMENT
[FreeTextEntry1] : taya deg changes  - We discussed their diagnosis and treatment options at length including the risks and benefits of both surgical and non-surgical options. - We will continue conservative treatment with a course of PT, icing, and anti-inflammatory medication. - The patient was provided with a prescription to work on scapular strengthening and rotator cuff strengthening. - The patient was advised to let pain guide the gradual advancement of activities.  - We also discussed the possible of a corticosteroid injection in order to help decrease inflammation and pain so that they can perform better therapy and they wished to proceed with this treatment course. - Follow up as needed in 6 weeks to re-evaluate progress with therapy   (may eventually need TSA)

## 2024-02-12 NOTE — ED PROVIDER NOTE - CADM POA PRESS ULCER
WAYS TO HELP PREVENT INFECTION        WASH YOUR HANDS OFTEN DURING THE DAY, ESPECIALLY BEFORE YOU EAT, AFTER USING THE BATHROOM, AND AFTER TOUCHING ANIMALS    STAY AWAY FROM PEOPLE WHO HAVE ILLNESSES YOU CAN CATCH; SUCH AS COLDS, FLU, CHICKEN POX    TRY TO AVOID CROWDS    STAY AWAY FROM CHILDREN WHO RECENTLY HAVE RECEIVED LIVE VIRUS VACCINES    MAINTAIN GOOD MOUTH CARE    DO NOT SQUEEZE OR SCRATCH PIMPLES    CLEAN CUTS & SCRAPES RIGHT AWAY AND DAILY UNTIL HEALED WITH WARM WATER, SOAP & AN ANTISEPTIC    AVOID CONTACT WITH LITTER BOXES, BIRD CAGES, & FISH TANKS    AVOID STANDING WATER, IE., BIRD BATHS, FLOWER POTS/VASES, OR HUMIDIFIERS    WEAR GLOVES WHEN GARDENING OR CLEANING UP AFTER OTHERS, ESPECIALLY BABIES & SMALL CHILDREN    DO NOT EAT RAW FISH, SEAFOOD, MEAT, OR EGGS     No

## 2024-03-01 ENCOUNTER — APPOINTMENT (OUTPATIENT)
Dept: ORTHOPEDIC SURGERY | Facility: CLINIC | Age: 74
End: 2024-03-01

## 2024-03-20 NOTE — PROGRESS NOTE ADULT - NS_MD_PANP_GEN_ALL_CORE
no
Attending and PA/NP shared services statement (NON-critical care):

## 2024-03-22 ENCOUNTER — APPOINTMENT (OUTPATIENT)
Dept: ORTHOPEDIC SURGERY | Facility: CLINIC | Age: 74
End: 2024-03-22
Payer: MEDICARE

## 2024-03-22 VITALS — BODY MASS INDEX: 33.11 KG/M2 | HEIGHT: 74 IN | WEIGHT: 258 LBS

## 2024-03-22 DIAGNOSIS — G56.02 CARPAL TUNNEL SYNDROME, LEFT UPPER LIMB: ICD-10-CM

## 2024-03-22 DIAGNOSIS — G56.01 CARPAL TUNNEL SYNDROME, RIGHT UPPER LIMB: ICD-10-CM

## 2024-03-22 PROCEDURE — J3490M: CUSTOM | Mod: NC

## 2024-03-22 PROCEDURE — 99213 OFFICE O/P EST LOW 20 MIN: CPT | Mod: 25

## 2024-03-22 PROCEDURE — L3908: CPT | Mod: RT

## 2024-03-22 PROCEDURE — 20526 THER INJECTION CARP TUNNEL: CPT | Mod: 50

## 2024-03-22 PROCEDURE — 73130 X-RAY EXAM OF HAND: CPT | Mod: 50

## 2024-03-22 NOTE — PHYSICAL EXAM
[de-identified] : R hand:  Tender volar wrist  Good finger ROM  +Tinels  +Phalens  +Compression test  Decreased sensation median nerve distribution   L hand:  Tender volar wrist  Good finger ROM  +Tinels  +Phalens  +Compression test  Decreased sensation median nerve distribution   Xrays neg

## 2024-03-22 NOTE — HISTORY OF PRESENT ILLNESS
[Dull/Aching] : dull/aching [de-identified] : Bilateral hands numbness and tingling  Wakes him at night Worse in the morning  [8] : 8 [6] : 6 [Tingling] : tingling [Nothing helps with pain getting better] : Nothing helps with pain getting better [] : no [FreeTextEntry1] : hands [FreeTextEntry3] : a month ago  [FreeTextEntry5] : he has pain, swelling and numbness. [FreeTextEntry6] : numbness [de-identified] : activity

## 2024-03-22 NOTE — ASSESSMENT
[FreeTextEntry1] : Wrist braces provided  Bilateral Carpal tunnel injection was performed because of pain inflammation Anesthesia: ethyl chloride sprayed topically Celestone 6mg: An injection of Celestone 1cc Lidocaine: An injection of Lidocaine 1% 1cc Marcaine: An injection of Marcaine 0.5% 1cc x2  Patient has tried OTC's including aspirin, Ibuprofen, Aleve etc or prescription NSAIDS, and/or exercises at home and/ or physical therapy without satisfactory response. After verbal consent using sterile preparation and technique. The risks, benefits, and alternatives to cortisone injection were explained in full to the patient. Risks outlined include but are not limited to infection, sepsis, bleeding, scarring, skin discoloration, temporary increase in pain, syncopal episode, failure to resolve symptoms, allergic reaction, symptom recurrence, and elevation of blood sugar in diabetics. Patient understood the risks. All questions were answered. After discussion of options, patient requested an injection. Oral informed consent was obtained and sterile prep was done of the injection site. Sterile technique was utilized for the procedure including the preparation of the solutions used for the injection. Patient tolerated the procedure well. Advised to ice the injection site this evening. Prep with betadine locally to site. Sterile technique used

## 2024-05-20 ENCOUNTER — RX RENEWAL (OUTPATIENT)
Age: 74
End: 2024-05-20

## 2024-05-20 RX ORDER — LOSARTAN POTASSIUM 25 MG/1
25 TABLET, FILM COATED ORAL
Qty: 90 | Refills: 1 | Status: ACTIVE | COMMUNITY
Start: 2020-08-26 | End: 1900-01-01

## 2024-07-09 ENCOUNTER — NON-APPOINTMENT (OUTPATIENT)
Age: 74
End: 2024-07-09

## 2024-07-09 ENCOUNTER — APPOINTMENT (OUTPATIENT)
Dept: CARDIOLOGY | Facility: CLINIC | Age: 74
End: 2024-07-09
Payer: MEDICARE

## 2024-07-09 VITALS
WEIGHT: 260 LBS | BODY MASS INDEX: 33.38 KG/M2 | HEART RATE: 74 BPM | OXYGEN SATURATION: 98 % | SYSTOLIC BLOOD PRESSURE: 130 MMHG | DIASTOLIC BLOOD PRESSURE: 80 MMHG

## 2024-07-09 DIAGNOSIS — Z86.79 OTHER SPECIFIED POSTPROCEDURAL STATES: ICD-10-CM

## 2024-07-09 DIAGNOSIS — I10 ESSENTIAL (PRIMARY) HYPERTENSION: ICD-10-CM

## 2024-07-09 DIAGNOSIS — I35.1 NONRHEUMATIC AORTIC (VALVE) INSUFFICIENCY: ICD-10-CM

## 2024-07-09 DIAGNOSIS — Z98.890 OTHER SPECIFIED POSTPROCEDURAL STATES: ICD-10-CM

## 2024-07-09 DIAGNOSIS — I77.810 THORACIC AORTIC ECTASIA: ICD-10-CM

## 2024-07-09 DIAGNOSIS — I48.91 UNSPECIFIED ATRIAL FIBRILLATION: ICD-10-CM

## 2024-07-09 PROCEDURE — 93000 ELECTROCARDIOGRAM COMPLETE: CPT

## 2024-07-09 PROCEDURE — G2211 COMPLEX E/M VISIT ADD ON: CPT

## 2024-07-09 PROCEDURE — 99214 OFFICE O/P EST MOD 30 MIN: CPT

## 2024-07-12 ENCOUNTER — RX RENEWAL (OUTPATIENT)
Age: 74
End: 2024-07-12

## 2024-07-29 NOTE — ED ADULT TRIAGE NOTE - PATIENT ON (OXYGEN DELIVERY METHOD)
Wound Clinic Physician Orders and Discharge Instructions  [facility]  [address], [address]  [place] [address]  Telephone: [phone]     FAX [phone]    NAME:  Ramin Brown Jr.  YOB: 1946  MEDICAL RECORD NUMBER:  [id number]  DATE:  7/29/2024      Return Appointment:  [] Dressing Supply Provider:   [x] Home Healthcare: [facility]. Fax [phone]  [x] Return Appointment: 2 Week(s)  [] Nurse Visit:     [] Discharge from [facility]: [] Healed            [] Refer to Provider:    Follow-up Information:  [] Ordered Tests:   [x] Referrals: Dr. Mcclellan  [x] Rx: Augmentin called in to pharmacy  [x] Other: Lymph pump 3 times daily x 1 hour each  [] Other:     Wound Cleansing:   Do not scrub or use excessive force.  Cleanse wound prior to applying a clean dressing with:  [] Normal Saline   [] Keep Wound Dry in Shower     [] Wound Cleanser   [x] Cleanse wound with Mild Soap & Water    [] Other:       Topical Treatments:  Do not apply lotions, creams, or ointments to wound bed unless directed.   [] Apply moisturizing lotion to skin surrounding the wound prior to dressing change.  [] Apply antifungal ointment to skin surrounding the wound prior to dressing change.  [] Apply thin film of moisture barrier ointment to skin immediately around wound.  [] Apply Betadine to skim immediately around wound      Dressings:           Wound Location R Leg   [x] Apply Primary Dressing:       [] MediHoney Gel [] MediHoney Alginate  [x] Calcium Alginate with Silver   [] Calcium Alginate without silver   [] Collagen with silver [] Collagen without Silver    [] Santyl with moist saline gauze     [] Hydrofera Blue (cut to size and moistened with normal saline)  [] Hydrofera Blue Ready (cut to size)      [] Normal Saline wet to dry  [] Betadine wet to dry    [] Hydrogel  [] Mepitel     [] Bactroban/Mupirocin   [] Iodoform Packing Strip [] Plain Packing Strip   [] Skin Sub:   [] Other:  room air

## 2024-08-26 DIAGNOSIS — J43.9 EMPHYSEMA, UNSPECIFIED: ICD-10-CM

## 2024-08-28 ENCOUNTER — NON-APPOINTMENT (OUTPATIENT)
Age: 74
End: 2024-08-28

## 2024-08-28 RX ORDER — GABAPENTIN 300 MG/1
300 CAPSULE ORAL
Refills: 0 | Status: ACTIVE | COMMUNITY

## 2024-08-29 RX ORDER — FLUTICASONE PROPIONATE AND SALMETEROL 500; 50 UG/1; UG/1
500-50 POWDER RESPIRATORY (INHALATION) TWICE DAILY
Qty: 1 | Refills: 5 | Status: ACTIVE | COMMUNITY
Start: 2024-08-26 | End: 1900-01-01

## 2024-09-06 ENCOUNTER — RX RENEWAL (OUTPATIENT)
Age: 74
End: 2024-09-06

## 2024-10-01 ENCOUNTER — APPOINTMENT (OUTPATIENT)
Facility: CLINIC | Age: 74
End: 2024-10-01
Payer: MEDICARE

## 2024-10-01 VITALS
OXYGEN SATURATION: 99 % | DIASTOLIC BLOOD PRESSURE: 94 MMHG | TEMPERATURE: 97.5 F | HEIGHT: 75 IN | WEIGHT: 259 LBS | HEART RATE: 62 BPM | RESPIRATION RATE: 18 BRPM | BODY MASS INDEX: 32.2 KG/M2 | SYSTOLIC BLOOD PRESSURE: 139 MMHG

## 2024-10-01 DIAGNOSIS — E66.9 OBESITY, UNSPECIFIED: ICD-10-CM

## 2024-10-01 DIAGNOSIS — I48.91 UNSPECIFIED ATRIAL FIBRILLATION: ICD-10-CM

## 2024-10-01 DIAGNOSIS — G47.33 OBSTRUCTIVE SLEEP APNEA (ADULT) (PEDIATRIC): ICD-10-CM

## 2024-10-01 DIAGNOSIS — J43.9 EMPHYSEMA, UNSPECIFIED: ICD-10-CM

## 2024-10-01 DIAGNOSIS — I35.1 NONRHEUMATIC AORTIC (VALVE) INSUFFICIENCY: ICD-10-CM

## 2024-10-01 DIAGNOSIS — I10 ESSENTIAL (PRIMARY) HYPERTENSION: ICD-10-CM

## 2024-10-01 DIAGNOSIS — Z86.69 PERSONAL HISTORY OF OTHER DISEASES OF THE NERVOUS SYSTEM AND SENSE ORGANS: ICD-10-CM

## 2024-10-01 DIAGNOSIS — J44.9 CHRONIC OBSTRUCTIVE PULMONARY DISEASE, UNSPECIFIED: ICD-10-CM

## 2024-10-01 DIAGNOSIS — I34.0 NONRHEUMATIC MITRAL (VALVE) INSUFFICIENCY: ICD-10-CM

## 2024-10-01 PROCEDURE — 99213 OFFICE O/P EST LOW 20 MIN: CPT | Mod: 25

## 2024-10-01 PROCEDURE — 94060 EVALUATION OF WHEEZING: CPT

## 2024-10-01 PROCEDURE — ZZZZZ: CPT

## 2024-10-01 PROCEDURE — 99203 OFFICE O/P NEW LOW 30 MIN: CPT | Mod: 25

## 2024-10-01 PROCEDURE — 94729 DIFFUSING CAPACITY: CPT

## 2024-10-01 PROCEDURE — 94727 GAS DIL/WSHOT DETER LNG VOL: CPT

## 2024-12-04 NOTE — HISTORY OF PRESENT ILLNESS
Post-hospitalization from ___ Hospital] : Post-hospitalization from [unfilled] Hospital [Admitted on: ___] : The patient was admitted on [unfilled] [Discharged on ___] : discharged on [unfilled] [Discharge Summary] : discharge summary [Discharge Med List] : discharge medication list [Med Reconciliation] : medication reconciliation has been completed [Patient Contacted By: ____] : and contacted by [unfilled] [FreeTextEntry3] : Patient was contacted by TCM RN on 3/16/22 for 24/48 hour call and documentation is present in the Clinical Viewer  [FreeTextEntry2] : 72y Male with PMH of Chronic Atrial Fibrillation, COPD, BARRON on CPAP, Low Back Pain, HTN and 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  In ED found to be febrile Tmax 102.7F, HR 94, RR22, CXR showed possible RML infiltrate, found to have human metapneumovirus, concern for superimposed bacterial pneumonia.  Admitted with sepsis due to pneumonia.  Given doses of empiric rocephin and zithromax.  Cultures remained negative, PCT came back 0.04.  Clinically improved, stable.  Antibiotics stopped due to low concern for superimposed bacterial process.  TTE showed normal EF, mod to severe LT atrial enlargement, grade 3 DD, mod enlarged RT atrium, mod MR/TR, mild pulm htn. \par \par Since dc denies cp/sob/pnd/orthopnea/ventura.  States that he has an occasional cough that is productive at times which is relieved with his prn pro air.  Ran out of advair, script sent but will need to obtain refills from pulm Dr. Christy on 3/23.  Refusing HC/PT- has no skilled needs.  TCM numbers provided for any questions/concerns.

## 2024-12-24 PROBLEM — F10.90 ALCOHOL USE: Status: ACTIVE | Noted: 2017-12-19

## 2025-01-07 ENCOUNTER — APPOINTMENT (OUTPATIENT)
Dept: CARDIOLOGY | Facility: CLINIC | Age: 75
End: 2025-01-07

## 2025-01-28 ENCOUNTER — NON-APPOINTMENT (OUTPATIENT)
Age: 75
End: 2025-01-28

## 2025-01-28 ENCOUNTER — APPOINTMENT (OUTPATIENT)
Dept: CARDIOLOGY | Facility: CLINIC | Age: 75
End: 2025-01-28
Payer: MEDICARE

## 2025-01-28 VITALS
HEIGHT: 75 IN | SYSTOLIC BLOOD PRESSURE: 130 MMHG | DIASTOLIC BLOOD PRESSURE: 90 MMHG | WEIGHT: 259 LBS | BODY MASS INDEX: 32.2 KG/M2 | HEART RATE: 67 BPM | OXYGEN SATURATION: 97 %

## 2025-01-28 DIAGNOSIS — I77.810 THORACIC AORTIC ECTASIA: ICD-10-CM

## 2025-01-28 DIAGNOSIS — Z86.79 OTHER SPECIFIED POSTPROCEDURAL STATES: ICD-10-CM

## 2025-01-28 DIAGNOSIS — Z98.890 OTHER SPECIFIED POSTPROCEDURAL STATES: ICD-10-CM

## 2025-01-28 PROCEDURE — 93000 ELECTROCARDIOGRAM COMPLETE: CPT

## 2025-01-28 PROCEDURE — G2211 COMPLEX E/M VISIT ADD ON: CPT

## 2025-01-28 PROCEDURE — 99214 OFFICE O/P EST MOD 30 MIN: CPT

## 2025-01-31 LAB
ALBUMIN SERPL ELPH-MCNC: 4 G/DL
ALP BLD-CCNC: 86 U/L
ALT SERPL-CCNC: 22 U/L
ANION GAP SERPL CALC-SCNC: 11 MMOL/L
AST SERPL-CCNC: 23 U/L
BILIRUB SERPL-MCNC: 1.2 MG/DL
BUN SERPL-MCNC: 14 MG/DL
CALCIUM SERPL-MCNC: 9.2 MG/DL
CHLORIDE SERPL-SCNC: 103 MMOL/L
CHOLEST SERPL-MCNC: 177 MG/DL
CO2 SERPL-SCNC: 27 MMOL/L
CREAT SERPL-MCNC: 1.02 MG/DL
EGFR: 76 ML/MIN/1.73M2
GLUCOSE SERPL-MCNC: 113 MG/DL
HCT VFR BLD CALC: 52.2 %
HDLC SERPL-MCNC: 76 MG/DL
HGB BLD-MCNC: 17.3 G/DL
LDLC SERPL CALC-MCNC: 87 MG/DL
MCHC RBC-ENTMCNC: 32.8 PG
MCHC RBC-ENTMCNC: 33.1 G/DL
MCV RBC AUTO: 98.9 FL
NONHDLC SERPL-MCNC: 101 MG/DL
PLATELET # BLD AUTO: 151 K/UL
POTASSIUM SERPL-SCNC: 4.4 MMOL/L
PROT SERPL-MCNC: 6.8 G/DL
RBC # BLD: 5.28 M/UL
RBC # FLD: 14.4 %
SODIUM SERPL-SCNC: 141 MMOL/L
TRIGL SERPL-MCNC: 74 MG/DL
WBC # FLD AUTO: 7.83 K/UL

## 2025-02-05 ENCOUNTER — APPOINTMENT (OUTPATIENT)
Facility: CLINIC | Age: 75
End: 2025-02-05
Payer: MEDICARE

## 2025-02-05 VITALS
HEIGHT: 74 IN | HEART RATE: 63 BPM | DIASTOLIC BLOOD PRESSURE: 95 MMHG | WEIGHT: 258 LBS | SYSTOLIC BLOOD PRESSURE: 152 MMHG | RESPIRATION RATE: 18 BRPM | OXYGEN SATURATION: 95 % | TEMPERATURE: 98 F | BODY MASS INDEX: 33.11 KG/M2

## 2025-02-05 DIAGNOSIS — I10 ESSENTIAL (PRIMARY) HYPERTENSION: ICD-10-CM

## 2025-02-05 DIAGNOSIS — G47.33 OBSTRUCTIVE SLEEP APNEA (ADULT) (PEDIATRIC): ICD-10-CM

## 2025-02-05 DIAGNOSIS — K74.60 UNSPECIFIED CIRRHOSIS OF LIVER: ICD-10-CM

## 2025-02-05 DIAGNOSIS — I34.0 NONRHEUMATIC MITRAL (VALVE) INSUFFICIENCY: ICD-10-CM

## 2025-02-05 DIAGNOSIS — J43.9 EMPHYSEMA, UNSPECIFIED: ICD-10-CM

## 2025-02-05 DIAGNOSIS — I48.91 UNSPECIFIED ATRIAL FIBRILLATION: ICD-10-CM

## 2025-02-05 PROCEDURE — 99213 OFFICE O/P EST LOW 20 MIN: CPT

## 2025-03-13 NOTE — PROGRESS NOTE ADULT - SUBJECTIVE AND OBJECTIVE BOX
Medication requested: methylphenidate HCl ER, OSM, (CONCERTA) 18 MG CR tablet   Last office visit: 1/6/25  [facility] appointments: none  Medication last refilled: 2/11/25; 30 + 0 refills, last sold 2/12/25 per   Last qualifying labs: N/A    Routing refill request to provider for review/approval because:  Drug not on the [facility] refill protocol     Ellis CISNEROS, RN  03/13/25 8:40 AM   SUBJECTIVE: Patient is 67 year old male PMH of HTN, LVH, mild chronic LV systolic dysfunction, cardiomyopathy, Afib ( on Xarelto), BARRON(on C-pap), COPD(emphysema), tricuspid regurgitation, lumbar disc disease s/p thoracic aneurysm repair. Prior to hospitilization, he was on Tramadol PRN. No known drug allergy.    MEDICATIONS  (STANDING):  aspirin enteric coated 81 milliGRAM(s) Oral daily  buDESOnide 160 MICROgram(s)/formoterol 4.5 MICROgram(s) Inhaler 2 Puff(s) Inhalation two times a day  cefuroxime  IVPB 1500 milliGRAM(s) IV Intermittent every 8 hours  cholecalciferol 1000 Unit(s) Oral daily  dextrose 50% Injectable 50 milliLiter(s) IV Push every 15 minutes  dextrose 50% Injectable 25 milliLiter(s) IV Push every 15 minutes  docusate sodium 100 milliGRAM(s) Oral three times a day  enoxaparin Injectable 40 milliGRAM(s) SubCutaneous daily  insulin lispro (HumaLOG) corrective regimen sliding scale   SubCutaneous three times a day before meals  insulin lispro (HumaLOG) corrective regimen sliding scale   SubCutaneous at bedtime  ketorolac   Injectable 15 milliGRAM(s) IV Push every 8 hours  metoprolol     tartrate 12.5 milliGRAM(s) Oral every 12 hours  montelukast 10 milliGRAM(s) Oral daily  pantoprazole    Tablet 40 milliGRAM(s) Oral before breakfast  sodium chloride 0.45%. 1000 milliLiter(s) (10 mL/Hr) IV Continuous <Continuous>    MEDICATIONS  (PRN):  oxyCODONE    5 mG/acetaminophen 325 mG 1 Tablet(s) Oral every 4 hours PRN Mild Pain (1 - 3)  oxyCODONE    5 mG/acetaminophen 325 mG 2 Tablet(s) Oral every 6 hours PRN Moderate Pain (4 - 6)      OBJECTIVE:    Vital Signs Last 24 Hrs  T(C): 37.8 (09 Feb 2018 12:00), Max: 37.9 (09 Feb 2018 08:00)  T(F): 100 (09 Feb 2018 12:00), Max: 100.2 (09 Feb 2018 08:00)  HR: 78 (09 Feb 2018 13:00) (61 - 89)  BP: --  BP(mean): --  RR: 27 (09 Feb 2018 13:00) (9 - 30)  SpO2: 91% (09 Feb 2018 13:00) (90% - 100%)    ASSESSMENT/ PLAN  67 year old male with thoracic aneurysm s/p repair via thoracotomy with inadequate pain control.   - IV PCA started  - monitor respiration

## 2025-05-01 ENCOUNTER — RX RENEWAL (OUTPATIENT)
Age: 75
End: 2025-05-01

## 2025-05-22 ENCOUNTER — RX RENEWAL (OUTPATIENT)
Age: 75
End: 2025-05-22

## 2025-06-10 ENCOUNTER — APPOINTMENT (OUTPATIENT)
Facility: CLINIC | Age: 75
End: 2025-06-10
Payer: MEDICARE

## 2025-06-10 VITALS
HEIGHT: 74 IN | TEMPERATURE: 97.8 F | BODY MASS INDEX: 32.34 KG/M2 | WEIGHT: 252 LBS | OXYGEN SATURATION: 98 % | DIASTOLIC BLOOD PRESSURE: 84 MMHG | SYSTOLIC BLOOD PRESSURE: 165 MMHG | HEART RATE: 66 BPM | RESPIRATION RATE: 18 BRPM

## 2025-06-10 PROCEDURE — ZZZZZ: CPT

## 2025-06-10 PROCEDURE — 94727 GAS DIL/WSHOT DETER LNG VOL: CPT

## 2025-06-10 PROCEDURE — 94060 EVALUATION OF WHEEZING: CPT

## 2025-06-10 PROCEDURE — 94729 DIFFUSING CAPACITY: CPT

## 2025-06-10 PROCEDURE — 99214 OFFICE O/P EST MOD 30 MIN: CPT | Mod: 25

## 2025-06-16 ENCOUNTER — APPOINTMENT (OUTPATIENT)
Dept: CARDIOLOGY | Facility: CLINIC | Age: 75
End: 2025-06-16
Payer: MEDICARE

## 2025-06-16 ENCOUNTER — NON-APPOINTMENT (OUTPATIENT)
Age: 75
End: 2025-06-16

## 2025-06-16 VITALS
OXYGEN SATURATION: 95 % | DIASTOLIC BLOOD PRESSURE: 99 MMHG | SYSTOLIC BLOOD PRESSURE: 146 MMHG | BODY MASS INDEX: 32.34 KG/M2 | HEART RATE: 70 BPM | HEIGHT: 74 IN | WEIGHT: 252 LBS

## 2025-06-16 PROCEDURE — G2211 COMPLEX E/M VISIT ADD ON: CPT

## 2025-06-16 PROCEDURE — 93000 ELECTROCARDIOGRAM COMPLETE: CPT

## 2025-06-16 PROCEDURE — 99215 OFFICE O/P EST HI 40 MIN: CPT

## 2025-07-09 ENCOUNTER — APPOINTMENT (OUTPATIENT)
Dept: CARDIOTHORACIC SURGERY | Facility: CLINIC | Age: 75
End: 2025-07-09
Payer: MEDICARE

## 2025-07-09 VITALS
HEIGHT: 74 IN | RESPIRATION RATE: 16 BRPM | DIASTOLIC BLOOD PRESSURE: 95 MMHG | HEART RATE: 71 BPM | TEMPERATURE: 97.5 F | BODY MASS INDEX: 32.08 KG/M2 | SYSTOLIC BLOOD PRESSURE: 156 MMHG | WEIGHT: 250 LBS | OXYGEN SATURATION: 97 %

## 2025-07-09 PROCEDURE — 99204 OFFICE O/P NEW MOD 45 MIN: CPT

## 2025-07-10 ENCOUNTER — MED ADMIN CHARGE (OUTPATIENT)
Age: 75
End: 2025-07-10

## 2025-07-10 ENCOUNTER — APPOINTMENT (OUTPATIENT)
Dept: CARDIOLOGY | Facility: CLINIC | Age: 75
End: 2025-07-10
Payer: MEDICARE

## 2025-07-10 PROCEDURE — 93306 TTE W/DOPPLER COMPLETE: CPT

## 2025-07-10 RX ORDER — PERFLUTREN 6.52 MG/ML
6.52 INJECTION, SUSPENSION INTRAVENOUS
Qty: 2 | Refills: 0 | Status: COMPLETED | OUTPATIENT
Start: 2025-07-10

## 2025-07-10 RX ADMIN — PERFLUTREN MG/ML: 6.52 INJECTION, SUSPENSION INTRAVENOUS at 00:00

## 2025-07-23 DIAGNOSIS — I42.8 OTHER CARDIOMYOPATHIES: ICD-10-CM

## 2025-08-04 ENCOUNTER — NON-APPOINTMENT (OUTPATIENT)
Age: 75
End: 2025-08-04

## 2025-08-06 ENCOUNTER — APPOINTMENT (OUTPATIENT)
Dept: CARDIOLOGY | Facility: CLINIC | Age: 75
End: 2025-08-06
Payer: MEDICARE

## 2025-08-06 PROCEDURE — A9500: CPT

## 2025-08-06 PROCEDURE — 93015 CV STRESS TEST SUPVJ I&R: CPT

## 2025-08-06 PROCEDURE — 78452 HT MUSCLE IMAGE SPECT MULT: CPT
